# Patient Record
Sex: FEMALE | Race: WHITE | NOT HISPANIC OR LATINO | Employment: PART TIME | URBAN - METROPOLITAN AREA
[De-identification: names, ages, dates, MRNs, and addresses within clinical notes are randomized per-mention and may not be internally consistent; named-entity substitution may affect disease eponyms.]

---

## 2020-05-17 ENCOUNTER — VIRTUAL VISIT (OUTPATIENT)
Dept: URGENT CARE | Facility: CLINIC | Age: 20
End: 2020-05-17

## 2020-05-17 ENCOUNTER — NURSE TRIAGE (OUTPATIENT)
Dept: NURSING | Facility: CLINIC | Age: 20
End: 2020-05-17

## 2020-05-17 DIAGNOSIS — Z53.9 ERRONEOUS ENCOUNTER--DISREGARD: Primary | ICD-10-CM

## 2020-05-17 NOTE — TELEPHONE ENCOUNTER
Patient calling - says she was in contact with someone tested positive for COVID19.  She had a rash on her face which resolved and now has headaches off and on.  She is requesting testing for COVID19 as she has roommates who have family members who would be at risk if exposed.    No difficulty breathing.      Triaged to disposition of Home Care.  Care advice given per protocol.  Patient states she intends to call [facility] tomorrow when the clinic is open to ask about testing.    Cyndy Mccracken RN  Triage Nurse Advisor    Reason for Disposition    [1] COVID-19 infection diagnosed or suspected AND [2] mild symptoms (fever, cough) AND [3] no trouble breathing or other complications    COVID-19 Testing, questions about    Additional Information    Negative: SEVERE difficulty breathing (e.g., struggling for each breath, speaks in single words)    Negative: Difficult to awaken or acting confused (e.g., disoriented, slurred speech)    Negative: Bluish (or gray) lips or face now    Negative: Shock suspected (e.g., cold/pale/clammy skin, too weak to stand, low BP, rapid pulse)    Negative: Sounds like a life-threatening emergency to the triager    Negative: SEVERE or constant chest pain (Exception: mild central chest pain, present only when coughing)    Negative: MODERATE difficulty breathing (e.g., speaks in phrases, SOB even at rest, pulse 100-120)    Negative: Patient sounds very sick or weak to the triager    Negative: MILD difficulty breathing (e.g., minimal/no SOB at rest, SOB with walking, pulse <100)    Negative: Chest pain    Negative: Fever > 103 F (39.4 C)    Negative: [1] Fever > 101 F (38.3 C) AND [2] age > 60    Negative: [1] Fever > 100.0 F (37.8 C) AND [2] bedridden (e.g., nursing home patient, CVA, chronic illness, recovering from surgery)    Negative: HIGH RISK patient (e.g., age > 64 years, diabetes, heart or lung disease, weak immune system)    Negative: Fever present > 3 days (72 hours)     Negative: [1] Fever returns after gone for over 24 hours AND [2] symptoms worse or not improved    Negative: [1] Continuous (nonstop) coughing interferes with work or school AND [2] no improvement using cough treatment per protocol    Negative: Cough present > 3 weeks    Protocols used: CORONAVIRUS (COVID-19) DIAGNOSED OR [id number] 4.22.20

## 2020-05-17 NOTE — PROGRESS NOTES
This encounter was opened in error. Please disregard.  Patient was called and reported that she thought this visit had been cancelled.

## 2021-09-07 ENCOUNTER — TELEPHONE (OUTPATIENT)
Dept: BEHAVIORAL HEALTH | Facility: CLINIC | Age: 21
End: 2021-09-07

## 2021-09-07 NOTE — TELEPHONE ENCOUNTER
[facility] called with pt to scheduled  eval for day/iop programming    eval 09/14/21   Annetta guillen

## 2021-09-14 ENCOUNTER — HOSPITAL ENCOUNTER (OUTPATIENT)
Dept: BEHAVIORAL HEALTH | Facility: CLINIC | Age: 21
Discharge: HOME | End: 2021-09-14
Attending: FAMILY MEDICINE | Admitting: FAMILY MEDICINE
Payer: COMMERCIAL

## 2021-09-14 PROCEDURE — 90791 PSYCH DIAGNOSTIC EVALUATION: CPT | Mod: GT | Performed by: COUNSELOR

## 2021-09-14 RX ORDER — RIZATRIPTAN BENZOATE 5 MG/1
5 TABLET, ORALLY DISINTEGRATING ORAL
COMMUNITY

## 2021-09-14 RX ORDER — GABAPENTIN 250 MG/5ML
300 SOLUTION ORAL 3 TIMES DAILY
COMMUNITY
End: 2021-11-10

## 2021-09-14 RX ORDER — PROPRANOLOL HCL 60 MG
CAPSULE, EXTENDED RELEASE 24HR ORAL DAILY
COMMUNITY

## 2021-09-14 RX ORDER — DULOXETIN HYDROCHLORIDE 60 MG/1
60 CAPSULE, DELAYED RELEASE ORAL 2 TIMES DAILY
COMMUNITY

## 2021-09-14 RX ORDER — HYDROXYZINE PAMOATE 25 MG/1
25 CAPSULE ORAL AT BEDTIME
COMMUNITY

## 2021-09-14 RX ORDER — ONDANSETRON 8 MG/1
8 TABLET, FILM COATED ORAL EVERY 8 HOURS PRN
COMMUNITY

## 2021-09-14 ASSESSMENT — ANXIETY QUESTIONNAIRES
6. BECOMING EASILY ANNOYED OR IRRITABLE: SEVERAL DAYS
GAD7 TOTAL SCORE: 14
4. TROUBLE RELAXING: NEARLY EVERY DAY
3. WORRYING TOO MUCH ABOUT DIFFERENT THINGS: MORE THAN HALF THE DAYS
1. FEELING NERVOUS, ANXIOUS, OR ON EDGE: NEARLY EVERY DAY
5. BEING SO RESTLESS THAT IT IS HARD TO SIT STILL: NEARLY EVERY DAY
7. FEELING AFRAID AS IF SOMETHING AWFUL MIGHT HAPPEN: SEVERAL DAYS
IF YOU CHECKED OFF ANY PROBLEMS ON THIS QUESTIONNAIRE, HOW DIFFICULT HAVE THESE PROBLEMS MADE IT FOR YOU TO DO YOUR WORK, TAKE CARE OF THINGS AT HOME, OR GET ALONG WITH OTHER PEOPLE: VERY DIFFICULT
2. NOT BEING ABLE TO STOP OR CONTROL WORRYING: SEVERAL DAYS

## 2021-09-14 ASSESSMENT — PATIENT HEALTH QUESTIONNAIRE - PHQ9: SUM OF ALL RESPONSES TO PHQ QUESTIONS 1-9: 21

## 2021-09-14 NOTE — PROGRESS NOTES
[facility]  Provider Name:  Sarah Duarte LPCC, JEFFERYC         PATIENT'S NAME: Francesca Griffin  PREFERRED NAME: Francesca  PRONOUNS:    MRN: [id number]  : 2000  ADDRESS: [address] [facility] [address]  ACCT. NUMBER:  [id number]  DATE OF SERVICE: 21  START TIME: 1310  END TIME: 1415  PREFERRED PHONE: [phone]  May we leave a program related message: Yes  SERVICE MODALITY:  Video Visit /Telephone visit      Provider verified identity through the following two step process.  Patient provided:  Patient  and Patient address    Telemedicine Visit: The patient's condition can be safely assessed and treated via synchronous audio and visual telemedicine encounter.      Reason for Telemedicine Visit: Patient has requested telehealth visit    Originating Site (Patient Location): Patient's home     Distant Site (Provider Location): Provider Remote Setting- Home Office    Consent:  The patient/guardian has verbally consented to: the potential risks and benefits of telemedicine (video visit) versus in person care; bill my insurance or make self-payment for services provided; and responsibility for payment of non-covered services.     Patient would like the video invitation sent by:  My Chart    Mode of Communication:  Video Conference via Amwell / Use telephone due to technical difficulties    As the provider I attest to compliance with applicable laws and regulations related to telemedicine.    UNIVERSAL ADULT Mental Health DIAGNOSTIC ASSESSMENT    Identifying Information:  Patient is a 21 year old, .  The pronoun use throughout this assessment reflects the patient's chosen pronoun.  Patient was referred for an assessment by Behavioral Health Provider at the [facility].  Patient attended the session alone.     Chief Complaint:   The reason for seeking services at this time is: Increased depression and anxiety.   The problem(s) began in middle school. Patient  "has attempted to resolve these concerns in the past through Individual therapy, medication management.    Social/Family History:  Patient reported they grew up in  [place], [place].  They were raised by biological parents.  Parents were always together.  Patient reported that their childhood as such: Generally happy but had a very stressful school experience.  Patient states that she was in a [employer] elementary school and started having homework in . They had to try out to get into their high school as it was an Art based high school, theater was three days a week on top of their academic work. Patient sister also went to these schools, so there was a lot of pressure to be able to perform. Patient described their current relationships with family of origin as such: \"Good, but emotionally distant, I don't want to worry them with my problems.\"     The patient describes their cultural background as .  Cultural influences and impact on patient's life structure, values, norms, and healthcare: Grew up in a city, raised in Rastafarian community (unitarian universalist and [place] [facility] [place]  Contextual influences on patient's health include: Family Factors emotionally closed off, middle income, patient sister was not able to leave for college due to financial issues, patient was only able to as a grandparent passed and left the family money. Patient has a lot of academic stress and has fears that if they don't do well in college they will be wasting their parents money. These factors will be addressed in the Preliminary Treatment plan. Patient identified their preferred language to be English. Patient reported they do not need the assistance of an  or other support involved in therapy.     Patient reported had no significant delays in developmental tasks.   Patient's highest education level was some college, patient is in college for Earth Science, they are concerned about the " general state of the world.  Patient identified the following learning problems: attention and concentration.  Modifications will not be used to assist communication in therapy. Patient reports they are able to understand written materials.    Patient's current relationship status is single.  Patient identified their sexual orientation as queer or pansexual.  Patient reported having zero child(ronny). Patient identified parents; siblings; friends as part of their support system.  Patient identified the quality of these relationships as good.      Patient's currently lives with three roommates also in college and they report that housing is stable.    Patient is currently student. They are taking less classes this semester.  Patient reports their finances are obtained through parents; other. Patient does identify finances as a current stressor.      Patient reported that they have not been involved with the legal system. Patient does not report they are under probation/ parole/ jurisdiction.      Patient's Strengths and Limitations:  Patient identified the following strengths or resources that will help them succeed in treatment: commitment to health and well being, friends / good social support, insight, intelligence and motivation. Things that may interfere with the patient's success in treatment include: lack of self worth, and worrying about the general state of the world and the future of it.    Personal and Family Medical History:  Patient does report a family history of mental health concerns.  Patient reports family history includes Anxiety Disorder in her sister; Depression in her sister; Mental Illness in her father and mother..     Patient reported the following previous diagnoses which include(s):  ADHD;an anxiety disorder;depression.  Patient reported symptoms began in middle school, they first noticed anxiety symptoms before they noticed depressive symptoms. Patient reports symptoms do impact ability to  function. Patient has received mental health services in the past which include the following: therapy;psychiatry.   Psychiatric Hospitalizations: none  Patient denies a history of civil commitment.  Currently, patient is receiving other mental health services.  These include psychiatry with Celine Basurto at [facility].    Patient has not had a physical exam to rule out medical causes for current symptoms.  Date of last physical exam was greater than a year ago and client was encouraged to schedule an exam with PCP. The patient does not have a PCP.  Patient has chronic migraines, sometimes 20 days out of the month, reports that they have some low level headache most days.  Patient denies pregnancy.  There are significant appetite / nutritional concerns / weight changes. They reports that their executive functioning is lacking-not figuring out what to eat when hungry, related to ADHD. Patient does not report a history of head injury / trauma / cognitive impairment.     GAIN-SS Tool:    When was the last time that you had significant problems... 9/14/2021   with feeling very trapped, lonely, sad, blue, depressed or hopeless about the future? Past month   with sleep trouble, such as bad dreams, sleeping restlessly, or falling asleep during the day? Past Month   with feeling very anxious, nervous, tense, scared, panicked or like something bad was going to happen? Past month   with becoming very distressed & upset when something reminded you of the past? Past month   with thinking about ending your life or committing suicide? Past month     When was the last time that you did the following things 2 or more times? 9/14/2021   Lied or conned to get things you wanted or to avoid having to do something? 2 to 12 months ago   Had a hard time paying attention at school, work or home? Past month   Had a hard time listening to instructions at school, work or home? Past month   Were a bully or threatened other people? Never    Started physical fights with other people? Never       Patient reports current meds as:   Outpatient Medications Marked as Taking for the 9/14/21 encounter (Hospital Encounter) with Sarah Duarte LPCC   Medication Sig     DULoxetine (CYMBALTA) 60 MG capsule Take 60 mg by mouth 2 times daily 200mg twice a day  300mg once a day     gabapentin (NEURONTIN) 300 MG/6ML oral solution Take 300 mg by mouth At Bedtime     hydrOXYzine (VISTARIL) 25 MG capsule Take 25 mg by mouth At Bedtime     ondansetron (ZOFRAN) 8 MG tablet Take 8 mg by mouth every 8 hours as needed for nausea     propranolol ER (INDERAL LA) 60 MG 24 hr capsule Take by mouth daily     rizatriptan (MAXALT-MLT) 5 MG ODT Take 5 mg by mouth at onset of headache for migraine     Current Outpatient Medications   Medication     DULoxetine (CYMBALTA) 60 MG capsule     gabapentin (NEURONTIN) 300 MG/6ML oral solution     hydrOXYzine (VISTARIL) 25 MG capsule     ondansetron (ZOFRAN) 8 MG tablet     propranolol ER (INDERAL LA) 60 MG 24 hr capsule     rizatriptan (MAXALT-MLT) 5 MG ODT     No current facility-administered medications for this encounter.     Medication Adherence:  Patient reports they are taking their medications as prescribed      Patient Allergies:  No Known Allergies    Medical History:  History reviewed. No pertinent past medical history.      Current Mental Status Exam:   Appearance:  Appropriate    Eye Contact:  Good   Psychomotor:  Normal       Gait / station:  no problem  Attitude / Demeanor: Cooperative  Pleasant  Speech      Rate / Production: Normal/ Responsive      Volume:  Normal  volume      Language:  intact  Mood:   Normal  Affect:   Appropriate    Thought Content: Clear   Thought Process: Goal Directed       Associations: No loosening of associations  Insight:   Good   Judgment:  Intact   Orientation:  All  Attention/concentration: Good    Rating Scales:    PHQ9:    PHQ-9 SCORE 9/14/2021   PHQ-9 Total Score 21       GAD7:    LLOYD-7 SCORE  9/14/2021   Total Score 14     CGI:     First: Considering your total clinical experience with this particular patient population, how severe are the patient's symptoms at this time?: 5 (9/14/2021  1:19 PM)      Most recent: Compared to the patient's condition at the START of treatment, this patient's condition is: 4 (9/14/2021  1:19 PM)    Substance Use:  Patient reported the following family history pertaining to substance use as such:. Patient has not received substance use disorder and/or gambling treatment in the past.  Patient has not ever been to detox.  Patient is not currently receiving any chemical dependency treatment. Patient reports no history of support group attendance.      Substance Age of first use Pattern and duration of use (include amounts and frequency) Date of last use     Withdrawal potential Route of administration   Alcohol 16 Patient reports that they drink on the weekends and 2-3 drinks with dinner. On average patient drinks between 2-4 drinks five days a week. Two drinks gets the patient tipsy. 9/13/2021  3 glasses of wine  Oral   Cannabis 16 Tried it when they were 16 and when they turned 18 they started using 2-3 time a week, can go months without using. 9/11/2021  Smoke    Amphetamines  No history of use      Cocaine/crack   No history of use      Hallucinogens  No history of use      Inhalants  No history of use      Heroin  No history of use      Other Opiates  No history of use      Benzodiazepine  No history of use      Barbiturates  No history of use      Over the counter meds  No history of use      Caffeine Child On average patient has two cups of coffee every day, sometimes three 9/14/2021  Oral   Nicotine         other substances not listed above:  Identify:   No history of use        Patient reported the following problems as a result of their substance use: no problems, not applicable.  Patient is not concerned about substance use. Patient reports no one is concerned about  their substance use.       Patient reports the following compulsive behaviors, concerns and treatment history:   Gambling - no issues reported.   Picking - no issues reported.   Hair Pulling - no issues reported.   Pornography - no issues reported.   Sexual Behaviors - no issues reported.   Shoplifting - no issues reported.   Shopping / Spending - no issues reported.   Social Media - no issues reported.   Video Games - no issue    Dimension Scale Ratings:    Dimension 1 -  Acute Intoxication/Withdrawal: 0 - No Problem  Dimension 2 - Biomedical: 0 - No Problem  Dimension 3 - Emotional/Behavioral/Cognitive Conditions: 1 - Minor Problem  Dimension 4 - Readiness to Change:  1 - Minor Problem  Dimension 5 - Relapse/Continued Use/ Continued Problem Potential: 1 - Minor Problem  Dimension 6 - Recovery Environment:  1 - Minor Problem    Significant Losses / Trauma / Abuse / Neglect Issues:   Patient did not serve in the .  There are indications or report of significant loss, trauma, abuse or neglect issues related to: Close family friend  about 18 months ago, around that same time a good friend ghosted them and they are still unclear why. Patient reports their biggest trauma was their education and academic pressure they had.  Concerns for possible neglect are not present.      Safety Assessment:   Current Safety Concerns:  Mower Suicide Severity Rating Scale (Short Version)  Mower Suicide Severity Rating (Short Version) 2021   Over the past 2 weeks have you felt down, depressed, or hopeless? yes   Over the past 2 weeks have you had thoughts of killing yourself? yes   Have you ever attempted to kill yourself? no     Patient denies current homicidal ideation and behaviors.  Patient denies current self-injurious ideation and behaviors.    Patient denied risk behaviors associated with substance use.  Patient denies any high risk behaviors associated with mental health symptoms.  Patient reports the  "following current concerns for their personal safety: None.  Patient reports there are not firearms in the house.         History of Safety Concerns:  Patient denied a history of homicidal ideation.     Patient reports a history of SIB (cutting), they started in middle school and stopped with little to no outside help, resumed cutting in college. Remote history of punching walls, skin picking, burning. Last engaged in behaviors over a year ago.  Patient denied a history of assaultive behaviors.    Patient denied a history of sexual assault behaviors.     Patient denied a history of risk behaviors associated with substance use.  Patient denies any history of high risk behaviors associated with mental health symptoms.  Patient reports the following protective factors: dedication to family or friends;regular sleep;effectively controls impulses;adherence with prescribed medication;living with other people;daily obligations;structured day;effective problem solving skills;commitment to well being;positive social skills;sense of personal control or determination    Risk Plan:  See Recommendations for Safety and Risk Management Plan    Review of Symptoms per patient report:  in terms of mental health symptoms, the patient reports the following: Depressive episode: reports depressed mood, characterized as moderate, without presence of neurovegetative symptoms.  Patient reports feeling helpless or hopeless. Patient reports that they have had these symptoms most of their life, with an \"up tick\" in severity and frequency about 3 years ago when they started college.  Dayan: Denies symptoms.  Psychosis: denies.  Anxiety: endorses long history of anxiety including restlessness, slow growing panic attacks that render them immobile. Other symptoms include: nausea, and a little bit of shaking, or small tremors. Patient reports these symptoms for as long as they can remember.  PTSD: denies symptoms.  OCD: denies symptoms.  Eating " disorder: Denies symptoms   SI/HI: Endorses passive suicidal ideation, denies plan at present, but in the past,has created one. Last plan was well over a year ago, currently denies plan and intent patient is able to contract for safety. Patient does not have a history of suicide attempts. Other mental health symptoms include: Depression: Change in sleep, Lack of interest, Excessive or inappropriate guilt, Change in energy level, Difficulties concentrating, Change in appetite, Psychomotor slowing or agitation, Suicidal ideation, Feelings of hopelessness, Feelings of helplessness, Low self-worth, Ruminations, Irritability, Feeling sad, down, or depressed, Withdrawn and Self-injurious behavior  Psychosis: No Symptoms  Panic:  Palpitations and Tremors  Post Traumatic Stress Disorder:  Dissociation   ADD / ADHD:  Inattentive, Difficulties listening, Poor task completion, Poor organizational skills, Distractibility, Forgetful and Restlessness/fidgety  Conduct Disorder: No symptoms  Autism Spectrum Disorder: No symptoms  Obsessive Compulsive Disorder: No Symptoms    Diagnostic Criteria:   A) Recurrent episode(s) - symptoms have been present during the same 2-week period and represent a change from previous functioning 5 or more symptoms (required for diagnosis)   - Depressed mood. Note: In children and adolescents, can be irritable mood.     - Diminished interest or pleasure in all, or almost all, activities.    - Increased sleep.    - Psychomotor activity retardation.    - Fatigue or loss of energy.    - Feelings of worthlessness or inappropriate and excessive guilt.    - Diminished ability to think or concentrate, or indecisiveness.    - Recurrent thoughts of death (not just fear of dying), recurrent suicidal ideation without a specific plan, or a suicide attempt or a specific plan for committing suicide.   B) The symptoms cause clinically significant distress or impairment in social, occupational, or other important  areas of functioning  C) The episode is not attributable to the physiological effects of a substance or to another medical condition  D) The occurrence of major depressive episode is not better explained by other thought / psychotic disorders  E) There has never been a manic episode or hypomanic episode    Mixed anxiety-depressive disorder: clinically significant symptoms of anxiety and depression, but the criteria are not met for either a specific Mood Disorder or a specific Anxiety Disorder.  Clinically significant social phobic symptoms that are related to the social impact of having a general medical condition or mental disorder  The client does not report enough symptoms for the full criteria of any specific Anxiety Disorder to have been met  Anxiety disorder is present, but at this time therapist is unable to determine whether it is primary.  Further assessment needed.  Client reports the following symptoms of anxiety:   - Excessive anxiety and worry about a number of events or activities (such as work or school performance).    - The person finds it difficult to control the worry.   - Restlessness or feeling keyed up or on edge.    - Being easily fatigued.    - Difficulty concentrating or mind going blank.    - Irritability.    - Muscle tension.    - Sleep disturbance (difficulty falling or staying asleep, or restless unsatisfying sleep).    - The focus of the anxiety and worry is not confined to features of an Axis I disorder.   - The anxiety, worry, or physical symptoms cause clinically significant distress or impairment in social, occupational, or other important areas of functioning.    - The disturbance is not due to the direct physiological effects of a substance (e.g., a drug of abuse, a medication) or a general medical condition (e.g., hyperthyroidism) and does not occur exclusively during a Mood Disorder, a Psychotic Disorder, or a Pervasive Developmental Disorder.    - The aforementioned symptoms  began in middle school and occurs  7 days per week and is experienced as severe.      Functional Status:  Patient reports the following functional impairments: academic performance, health maintenance, management of the household and or completion of tasks, relationship(s), social interactions and work / vocational responsibilities.       WHODAS:   WHODAS 2.0 Total Score 9/14/2021   Total Score 31     Programmatic care:  Current LOCUS was assessed and patient needs the following level of care based on score 18  .    Clinical Summary:  1. Reason for assessment: Patient referred by current providers due to worsening mental health symptoms and suicidal ideation.  2. Psychosocial, Cultural and Contextual Factors: None identified.  3. Principal DSM5 Diagnoses  (Sustained by DSM5 Criteria Listed Above):   296.33 (F33.2) Major Depressive Disorder, Recurrent Episode, Severe _ and With mixed features.  4. Other Diagnoses that is relevant to services:   300.01 (F41.0) Panic Disorder  300.00 (F41.9) Unspecified Anxiety Disorder.  5. Provisional Diagnosis:  NA.  6. Prognosis: Return to Normal Functioning and Relieve Acute Symptoms.  7. Likely consequences of symptoms if not treated:  If untreated, patient's mental health will likely deteriorate and may require a higher level of care.  8. Client strengths include:  creative, educated, empathetic, goal-focused, has a previous history of therapy, insightful, intelligent, motivated, support of family, friends and providers and wants to learn .     Recommendations:     1. A safety and risk management plan has been developed including: Patient consented to co-developed safety plan.  Safety and risk management plan was completed. Patient agreed to use safety plan should any safety concerns arise.  A copy was given to the patient.. Report to child / adult protection services was not applicable.     2. Patient did not identify Baptist, ethnic or cultural issues relevant to therapy at  this time     3. Initial Treatment will focus on:               Depressed Mood -               Anxiety -               Functional Impairment at: home and school/work.              Risk Management / Safety Concerns related to: SI, SIB     4. Resources/Service Plan:    services are not indicated.   Modifications to assist communication are not indicated.   Additional disability accommodations are not indicated.      5. Collaboration:   Collaboration / coordination of treatment will be initiated with the following support professionals:      6.  Referrals:   The following referral(s) will be initiated:  Next Scheduled Appointment: TBD.     A Release of Information has been obtained for the following:      -Psychiatry: Celine Ahuja    7. NILDA:     NILDA:  Discussed the general effects of drugs and alcohol on health and well-being.    8. Records:   These were not available for review at time of assessment. Information in this assessment was obtained from the medical record and provided by patient who presents as a good historian. Patient will have open access to their mental health medical record.      Provider Name/ Credentials: GENI Avendano LADC,  Diagnostic Assessment completed on 2021                      LOCUS Worksheet     Name: Francesca Griffin MRN: [id number]    : 2000      Gender:  female    PMI:     Provider Name: Obed   Provider NPI:  [id number]     Actual level of Care Provided:  Therapy and Medication Management    Service(s) receiving or referred to:  IOP    Reason for Variance: Due to worsening mental health symptoms, decline in functioning, and passive SI      Rating completed by: GENI Avendano, EUNICE      I. Risk of Harm:   2      Low Risk of Harm    II. Functional Status:   3      Moderate Impairment    III. Co-Morbidity:   2      Minor Co-Morbidity    IV - A. Recovery Environment - Level of Stress:   3      Moderately Stress Environment    IV - B. Recovery  "Environment - Level of Support:   3      Limited Support in Environment    V. Treatment and Recovery History:   3      Moderate to Equivocal Response to Treatment and Recovery Management    VI. Engagement and Recovery Project:   2      Positive Engagement and Recovery       18 Composite Score    Level of Care Recommendation:   17 to 19       High Intensity Community Based Services                     Outpatient Mental Health Services - Adult    MY COPING PLAN FOR SAFETY    PATIENT'S NAME: Francesca Griffin  MRN:   [id number]    SAFETY PLAN:    Step 1: Warning signs / cues (Thoughts, images, mood, situation, behavior) that a crisis may be developing:      Thoughts: \"I don't matter\", \"People would be better off without me\", \"I'm a burden\", \"I can't do this anymore\", \"I just want this to end\", and \"Nothing makes it better.\"    Images: obsessive thoughts of death or dying and flashbacks.    Thinking Processes: ruminations (can't stop thinking about my problems): racing thoughts, intrusive thoughts (bothersome, unwanted thoughts that come out of nowhere), highly critical and negative thoughts, disorganized thinking, and paranoia.    Mood: worsening depression, hopelessness, helplessness, intense anger, intense worry, agitation, disinhibited (not caring about things or consequences), and mood swings    Behaviors: isolating/withdrawing, using drugs, using alcohol, can't stop crying, impulsive, reckless behaviors (acting without thinking), giving things away, saying good-bye, aggression, not taking care of myself, not taking care of my responsibilities, sleeping too much, and not sleeping enough.    Situations: loss, legal issues, changes in symptoms, relationship problems, relapse, and financial stress       Step 2: Coping strategies - Things I can do to take my mind off of my problems without contacting another person (relaxation technique, physical activity):      Distress Tolerance Strategies:  relaxation activities: A few " "self-soothing ideas to consider: taking a hot bath or shower, listening to music, or exercising, listen to positive upbeat music.    Physical Activities: go for a walk, exercise, yoga, meditation, deep breathing, and stretching.    Focus on helpful thoughts:  \"This is temporary\", \"I will get through this\", and remind myself of what is important to me.      Step 3: People and social settings that provide distraction:     Movie theater, zoo, coffee shop, park, library, community center, gym , [employer]oration[employer], school, and/or work.    Step 4: Remind myself of people and things that are important to me and worth living for:      Friends and family.    Step 5: When I am in crisis, I can ask these people to help me use my safety plan:      Your list of trusted contacts may include your significant other, friends, relatives, or your .    Step 6: Making the environment safe:       Take all medicines as directed.  Make no changes unless your doctor suggests them.     Go to all your doctor visits.    Be sure to have all your required lab tests. This way, your medicines can be refilled on time.    Do not use any drugs not prescribed by your doctor.    Avoid alcohol.    Remove all sharps/firearms.    Lock up medications.    Step 7: Professionals or agencies I can contact during a crisis: Members of your professional health care team can include your psychiatrist and your therapist as well as a crisis hotline.      Suicide Prevention Lifeline: 1-142-447-TALK (0337)    Crisis Text Line Service (available 24 hours a day, 7 days a week): Text [place] to 163869    Call  **CRISIS (802228) from a cell phone to talk to a team of professionals who can help you.    [facility] Resources:    Crisis Intervention: 980.762.5599 or 861-205-8871 (TTY: 872.111.3524).  Call anytime for help.  Alcoholics Anonymous (www.alcoholics-anonymous.org): Check your phone book for your local chapter.  Suicide Awareness Voices of Education (SAVE) " (www.save.org): 888-511-SAVE (7283)  National Suicide Prevention Line (www.mentalhealthmn.org): 699-465-QRMK (9948)  Mental Health Consumer/Survivor Network of [place] (www.mhcsn.net): 614.938.4413 or 973-119-9275  Mental Health Association [place] (www.mentalhealth.org): 123.153.5377 or 635-091-6489  [facility] Crisis (COPE) Response - Adult 598 893-0835  Suicide Prevention Lifeline: 7-029-283-TALK (3251)   Crisis Text Line: text HOME to 997796 in the [place]  KB [place]: (381) 797-7730        Call 911 or go to my nearest emergency department.     I helped develop this safety plan and agree to use it when needed.  I have been given a copy of this plan.        Today s date:  9/14/2021    Adapted from Safety Plan Template 2008 Karly Gardner and Ventura Moss is reprinted with the express permission of the authors.  No portion of the Safety Plan Template may be reproduced without the express, written permission.  You can contact the authors at [email or web address] or [email or web address]

## 2021-09-15 ENCOUNTER — BEH TREATMENT PLAN (OUTPATIENT)
Dept: BEHAVIORAL HEALTH | Facility: CLINIC | Age: 21
End: 2021-09-15
Attending: PSYCHIATRY & NEUROLOGY
Payer: COMMERCIAL

## 2021-09-15 DIAGNOSIS — F33.2 MAJOR DEPRESSIVE DISORDER, RECURRENT EPISODE, SEVERE WITH ANXIOUS DISTRESS (H): ICD-10-CM

## 2021-09-15 ASSESSMENT — ANXIETY QUESTIONNAIRES: GAD7 TOTAL SCORE: 14

## 2021-09-15 NOTE — TELEPHONE ENCOUNTER
----- Message from Sarah Duarte, LPCC sent at 9/15/2021 12:48 PM CDT -----  Regarding: ADT Start  Scheduling Request    Patient Name: Francesca Griffin  Location of programming: Virtual  Start Date: Monday 9/20/2021  Group: ADULT MH DAY 4B IOP.  [id number].  1PM - 4PM. MARK, T, TH   Attending Provider (MD): Moose  Number of visits to be scheduled: 36  Duration of Appointment in minutes: 180  Visit Type: Zoom - 2650

## 2021-09-16 ENCOUNTER — TELEPHONE (OUTPATIENT)
Dept: BEHAVIORAL HEALTH | Facility: CLINIC | Age: 21
End: 2021-09-16

## 2021-09-16 NOTE — PROGRESS NOTES
"Admission Date: 9/16/2021    Confirm patient pronouns: they/them    Why are you seeking treatment/What do you want to focus on during treatment? \"I want to feel better and have a baseline of being to function without my mental health getting in the way.\"    Identify any current concerns with potential impact to admission:     medication/medical concerns: None reported     immediate safety concerns: None reported    Does patient have safety plan? Yes, sent copy to patient via [employer]  Note: Please copy safety plan copied into BEH Encounter     Other (insurance/childcare/transportation/housing/planned absences/etc): client experiences chronic migraines and worries about the 80% attendance policy.      Patient[place].     Does patient need appointment with provider? Yes    Review patient's program schedule and inform them of any variation due to late days or holidays.                                                                                  Completed by: MANNY Farfan on 9/16/2021 at 8:40 AM    "

## 2021-09-16 NOTE — PROGRESS NOTES
"Outpatient Mental Health Services - Adult     MY COPING PLAN FOR SAFETY     PATIENT'S NAME:    Francesca Griffin  MRN:                           [id number]     SAFETY PLAN:     Step 1: Warning signs / cues (Thoughts, images, mood, situation, behavior) that a crisis may be developing:     ? Thoughts: \"I don't matter\", \"People would be better off without me\", \"I'm a burden\", \"I can't do this anymore\", \"I just want this to end\", and \"Nothing makes it better.\"  ? Images: obsessive thoughts of death or dying and flashbacks.  ? Thinking Processes: ruminations (can't stop thinking about my problems): racing thoughts, intrusive thoughts (bothersome, unwanted thoughts that come out of nowhere), highly critical and negative thoughts, disorganized thinking, and paranoia.  ? Mood: worsening depression, hopelessness, helplessness, intense anger, intense worry, agitation, disinhibited (not caring about things or consequences), and mood swings  ? Behaviors: isolating/withdrawing, using drugs, using alcohol, can't stop crying, impulsive, reckless behaviors (acting without thinking), giving things away, saying good-bye, aggression, not taking care of myself, not taking care of my responsibilities, sleeping too much, and not sleeping enough.  ? Situations: loss, legal issues, changes in symptoms, relationship problems, relapse, and financial stress   ?    Step 2: Coping strategies - Things I can do to take my mind off of my problems without contacting another person (relaxation technique, physical activity):     ? Distress Tolerance Strategies:  relaxation activities: A few self-soothing ideas to consider: taking a hot bath or shower, listening to music, or exercising, listen to positive upbeat music.  ? Physical Activities: go for a walk, exercise, yoga, meditation, deep breathing, and stretching.  ? Focus on helpful thoughts:  \"This is temporary\", \"I will get through this\", and remind myself of what is important to me.       Step 3: " People and social settings that provide distraction:      Movie theater, zoo, coffee shop, park, library, community center, gym , Yazidi, school, and/or work.     Step 4: Remind myself of people and things that are important to me and worth living for:       Friends and family.     Step 5: When I am in crisis, I can ask these people to help me use my safety plan:       Your list of trusted contacts may include your significant other, friends, relatives, or your .     Step 6: Making the environment safe:      ? Take all medicines as directed.  Make no changes unless your doctor suggests them.   ? Go to all your doctor visits.  ? Be sure to have all your required lab tests. This way, your medicines can be refilled on time.  ? Do not use any drugs not prescribed by your doctor.  ? Avoid alcohol.  ? Remove all sharps/firearms.  ? Lock up medications.     Step 7: Professionals or agencies I can contact during a crisis: Members of your professional health care team can include your psychiatrist and your therapist as well as a crisis hotline.     ? Suicide Prevention Lifeline: 4-865-657-TALK (3127)  ? Crisis Text Line Service (available 24 hours a day, 7 days a week): Text [place] to 532140  ? Call  **CRISIS (374313) from a cell phone to talk to a team of professionals who can help you.     [facility] Resources:     Crisis Intervention: 658.384.8277 or 684-508-6944 (TTY: 771.348.8970).  Call anytime for help.  Alcoholics Anonymous (www.alcoholics-anonymous.org): Check your phone book for your local chapter.  Suicide Awareness Voices of Education (SAVE) (www.save.org): 000-744-PMUT (7476)  National Suicide Prevention Line (www.mentalhealthmn.org): 136-032-BOYN (4335)  Mental Health Consumer/Survivor Network of [place] (www.mhcsn.net): 842.871.9537 or 369-381-1581  Mental Health Association of [place] (www.mentalhealth.org): 471.800.3402 or 380-056-7690  [facility] Crisis (COPE) Response - Adult 612  323-0749  Suicide Prevention Lifeline: 4-344-345-TALK (5329)   Crisis Text Line: text HOME to 932640 in the [place]  KB [place]: (357) 580-4741        ? Call 911 or go to my nearest emergency department.             I helped develop this safety plan and agree to use it when needed.  I have been given a copy of this plan.          Today s date:  9/14/2021     Adapted from Safety Plan Template 2008 Karly Gardner and Ventura Moss is reprinted with the express permission of the authors.  No portion of the Safety Plan Template may be reproduced without the express, written permission.  You can contact the authors at [email or web address] or [email or web address]

## 2021-09-16 NOTE — TELEPHONE ENCOUNTER
Writer called client to complete the admission paperwork.  Writer sent client the PHQ-9, LLOYD-7 and a copy of their safety plan via [employer].  See admission BEH encounter for more information.

## 2021-09-16 NOTE — TELEPHONE ENCOUNTER
Writer called client to go over admission paperwork.  Client requested writer to call back at 3:30pm to complete the paperwork.

## 2021-09-20 ENCOUNTER — TELEPHONE (OUTPATIENT)
Dept: BEHAVIORAL HEALTH | Facility: CLINIC | Age: 21
End: 2021-09-20

## 2021-09-20 ENCOUNTER — HOSPITAL ENCOUNTER (OUTPATIENT)
Dept: BEHAVIORAL HEALTH | Facility: CLINIC | Age: 21
End: 2021-09-20
Attending: PSYCHIATRY & NEUROLOGY
Payer: COMMERCIAL

## 2021-09-20 PROBLEM — F33.2 MAJOR DEPRESSIVE DISORDER, RECURRENT EPISODE, SEVERE WITH ANXIOUS DISTRESS (H): Status: ACTIVE | Noted: 2021-09-20

## 2021-09-20 PROCEDURE — 90853 GROUP PSYCHOTHERAPY: CPT | Mod: GT

## 2021-09-20 PROCEDURE — 90853 GROUP PSYCHOTHERAPY: CPT | Mod: 95

## 2021-09-20 NOTE — PROGRESS NOTES
Adult Day Treatment Program:  Individualized Treatment Plan       Date of Plan: 21    Name: Francesca Griffin MRN: [id number]    : 2000     Program: Adult Day Treatment Program (ADT)    Clinical Track: 4B    DSM5 Diagnosis:  296.33 (F33.2) Major Depressive Disorder, Recurrent Episode, Severe _ and With mixed features.  300.01 (F41.0) Panic Disorder  300.00 (F41.9) Unspecified Anxiety Disorder.    55+ Multidisciplinary Team Members:  Dr Frederic Virk MD and/or Dr. Catherine Clarke PsyD, , and/or Dr. Iban Nelson MD,    Francesca Griffin will participate in the Adult Day Treatment Program 3 days per week, 3 hours per day.   Anticipated duration/discharge: 12 weeks    Due to COVID-19, services will be delivered via telemedicine until further notice.     Program Start Date: 21  Anticipated Discharge Date: 2021 (pending authorization/clinical changes)    NOTE: Complete CGI     Review Date: Does Francesca Griffin continue to meet criteria to participate in the ADT Program, 3 days per week; 3 hours per day?   2021 Yes MANNY Farfan on 2021 at 10:43 AM     2021 yes   21 discharged             Client Strengths:  caring, creative, has a previous history of therapy, insightful, motivated, wants to learn and willing to relate to others    Client Participation in Plan:  Contributed to goals and plan   Attended individual treatment plan meeting on 2021  Agrees with plan   Received copy of treatment plan   Discussed with staff     Areas of Vulnerability:  Anxiety  Depressive symptoms   Sensory deficit: ADHD, Sensory Processing Challenges  Physical/medical: Chronic Migraines    Long-Term Goals:  Knowledge about illness and management of symptoms   Maintenance of personal safety   Breaking the pattern of mental burnout     Abuse Prevention Plan:  Safe, therapeutic environment   Education regarding illness and skill development   Coordination with care providers     Discharge  "Criteria:  Satisfactory progress toward treatment goals   Improvement re: identified problems and symptoms   Ability to continue recovery at next level of service   Has a discharge plan in place      Areas of Treatment Focus     Why are you seeking treatment/What do you want to focus on during treatment? \"Client responded with the hope that group offers a space designated to talking about mental health since they are unable to talk about mental health with their family and friends.  Client also hopes to work on self-validation and avoiding  gaslighting  themselves.   \"       Area of Treatment Focus:   Symptom Stabilization and Management  Start Date:    9/27/21    Goal:  Target Date: 11/18/2021 Status: Completed  Francesca would like to use group to get the support and feedback they do not get in social or family relationships.   They would like to improve their stress management skills.  They would like to develop a spot between being not anxious and panicked.      Progress:   11/18/21:  Francesca is consistently using group to ask for feedback from peers.    They are practicing stress management skill and skills for managing anxiety/panic.  12/9/21:  Client effectively used group to learn and practice skills.        Treatment Strategies:   Teach adaptive coping skills and communication skills        Area of Treatment Focus:   Personal Safety  Start Date:    9/27/21    Goal:  Target Date: 11/18/2021 Status: Completed  Client will notify staff when needing assistance to develop or implement a coping plan to manage suicidal or self injurious urges.  Francesca denies current suicidal ideation.   They deny current self-injurious ideation.      Progress:   11/18/21:  Francesca has used skills to manage hopelessness, passive suicidal thoughts and self-injurious ideation.    12/9/21:  Francesca consistently used skills to manage SI/SIB thoughts.  They denied SI at discharge.    Treatment Strategies:   Teach adaptive coping skills and " "communication skills      Area of Treatment Focus:   Community Resources / Support and Discharge Planning  Start Date:    9/27/21    Goal:  Target Date: 11/18/2021 Status: Completed  Will develop an aftercare / transition plan by discharge.   Francesca gets psychiatry with Celine Basurto at [facility].  Francesca does not have an individual therapist.  Francesca is [facility] up with the [facility].        Progress:   11/18/21:  Francesca worked to complete two incomplete classes.  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  They are withdrawing from the current semester and are using the [facility] for assistance.  12/9/21:   Francesca is searching for a gender specialist therapist.  They have other providers in place.   They will spend the holidays at home then decide the next steps.        Treatment Strategies:   Use reality based supportive approach     Perla Garvin, [facility] HospitalMANNY Newberry on 9/27/2021 at 6:28 PM        NOTE: Required signatures are completed manually and scanned into the electronic medical record. See \"Media\" tab in epic.    The Individualized Treatment Plan Signature Page has been routed to the provider for co-sign.        "

## 2021-09-20 NOTE — TELEPHONE ENCOUNTER
RN Review of Medical History / Physical Health Screen  Outpatient Mental Health Programs - [facility] Mental Health Day Treatment    PATIENT'S NAME: Francesca Griffin  MRN:   [id number]  :   2000  ACCT. NUMBER: [id number]  CURRENT AGE:  21 year old    DATE OF DIAGNOSTIC ASSESSMENT: 21  DATE OF ADMISSION: 21     Please see Diagnostic Assessment for additional Medical History.     General Health:   Have you had any exposure to any communicable disease in the past 2-3 weeks? yes has cold     Are you aware of safe sex practices? yes   Do you have a history of seizures?     If so, do you have a seizure plan? Known triggers?     Notify patient that we will call 911 (if virtual) or a code (if in-person), if we were to witness seizure during group. no            yes     Nutrition:    Are you on a special diet? If yes, please explain:  yes pescatarian    Do you have any concerns regarding your nutritional status? If yes, please explain:  no   Have you had any appetite changes in the last 3 months?  Yes, decreased linda due to symptoms     Have you had any weight loss or weight gain in the last 3 months?  No     Do you have a history of an eating disorder? no   Do you have a history of being in an eating disorder program? no     Patient height and weight recorded by RN in epic flowsheet: No - Unable to measure  Because of temporary in-person programmatic suspension due to COVID-19 pandemic, all pt weights and heights will be collected through patient self-report an recorded in physical health screening progress note upon admission to the program.                            Height/Weight Review:  Patient reported height: 5'3       Patient reports weight:  Date last checked: 140lbs      Any referrals/needs identified? none             Fall Risk:   Have you had any falls in the past 3 months? no     Do you currently use any assistive devices for mobility?   no      Additional  Comments/Assessment:      No concerns    Per completion of the Medical History / Physical Health Screen, is there a recommendation to see / follow up with a primary care physician/clinic or dentist?    Yes, Recommendations: establish with PCP. Has a provider in mind and will reach out if they need assistance with obtaining a PCP.      Gabriele Ball RN  9/20/2021

## 2021-09-20 NOTE — TELEPHONE ENCOUNTER
Writer called Pt today to check in since did not start as planned.  Writer left a vm message with Pt requesting a return phone call to the main program line.

## 2021-09-20 NOTE — GROUP NOTE
Psychotherapy Group Note    PATIENT'S NAME: Francesca Griffin  MRN:   [id number]  :   2000  ACCT. NUMBER: [id number]  DATE OF SERVICE: 21  START TIME:  3:00 PM  END TIME:  3:50 PM  FACILITATOR: Hattie Virk  TOPIC:  EBP Group: [facility] Adult Mental Health Day Treatment  TRACK: 4B                                      Service Modality:  Video Visit     Telemedicine Visit: The patient's condition can be safely assessed and treated via synchronous audio and visual telemedicine encounter.      Reason for Telemedicine Visit:  covid 19     Originating Site (Patient Location): Patient's home    Distant Site (Provider Location): Provider Remote Setting- Home Office    Consent:  The patient/guardian has verbally consented to: the potential risks and benefits of telemedicine (video visit) versus in person care; bill my insurance or make self-payment for services provided; and responsibility for payment of non-covered services.     Patient would like the video invitation sent by:  My Chart    Mode of Communication:  Video Conference via Medical Zoom    As the provider I attest to compliance with applicable laws and regulations related to telemedicine.          NUMBER OF PARTICIPANTS: 7    Summary of Group / Topics Discussed:  Mindfulness: What is Mindfulness: Patients received an overview on what mindfulness is and how mindfulness can benefit general health, mental health symptoms, and stressors. The history of mindfulness, its application to mental health therapies, and key concepts were also discussed. Patients discussed current awareness, knowledge, and practice of mindfulness skills. Patients also discussed barriers to mindfulness practice.     Patient Session Goals / Objectives:    Demonstrated understanding of key concepts and application to daily life    Identified when/how to use mindfulness     Resolved barriers to practice    Identified plan to use mindfulness in daily  life      Patient Participation / Response:  Fully participated with the group by sharing personal reflections / insights and openly received / provided feedback with other participants.    Demonstrated understanding of topics discussed through group discussion and participation, Demonstrated understanding of mindfulness skills and benefits of practice and Identified / Expressed personal readiness to practice mindfulness skills    Treatment Plan:  Patient has an initial individualized treatment plan that was created as part of their diagnostic assessment / admission process.  A master individualized treatment plan is in the process of being developed with the patient and multi-disciplinary care team.    Hattie Virk

## 2021-09-20 NOTE — GROUP NOTE
Process Group Note    PATIENT'S NAME: Francesca Griffin  MRN:   [id number]  :   2000  ACCT. NUMBER: [id number]  DATE OF SERVICE: 21  START TIME:  1:00 PM  END TIME:  1:50 PM  FACILITATOR: Perla Garvin LICSW; Adrianna Montoya LGSW  TOPIC:  Process Group    Diagnoses:  Mixed anxiety-depressive disorder: clinically significant symptoms of anxiety and depression, but the criteria are not met for either a specific Mood Disorder or a specific Anxiety Disorder.  Clinically significant social phobic symptoms that are related to the social impact of having a general medical condition or mental disorder  The client does not report enough symptoms for the full criteria of any specific Anxiety Disorder to have been met  Anxiety disorder is present, but at this time therapist is unable to determine whether it is primary.  Further assessment needed.      [facility] Mental Health Day Treatment  TRACK: 4B    NUMBER OF PARTICIPANTS: 7                                      Service Modality:  Video Visit     Telemedicine Visit: The patient's condition can be safely assessed and treated via synchronous audio and visual telemedicine encounter.      Reason for Telemedicine Visit: Services only offered telehealth    Originating Site (Patient Location): Patient's home    Distant Site (Provider Location): Provider Remote Setting- Home Office    Consent:  The patient/guardian has verbally consented to: the potential risks and benefits of telemedicine (video visit) versus in person care; bill my insurance or make self-payment for services provided; and responsibility for payment of non-covered services.     Patient would like the video invitation sent by:  My Chart    Mode of Communication:  Video Conference via Medical Zoom    As the provider I attest to compliance with applicable laws and regulations related to telemedicine.                Data:    Session content: At the start of this group, patients were invited to  check in by identifying themselves, describing their current emotional status, and identifying issues to address in this group.   Area(s) of treatment focus addressed in this session included Symptom Management, Personal Safety and Community Resources/Discharge Planning.  Client reported being sick today and was  terrified that they had given everyone COVID .  Client reported getting a negative covid test result this morning.  Client further introduced themselves stating that they grew up in [place] and moved to [place] for school  on purpose .  Client reported living in [place] for four years and that they don t have plans on graduating  anytime soon .  Client explained that they are prioritizing their mental health and slowing down by taking two classes this semester.  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  Client reported  struggling a lot since they came to college  with symptoms worsening in the last 1.5 months.  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  Client added that a recent ADHD diagnosis and executive dysfunction also has caused client to miss completing class work.  Client reports the desire to have a  Baseline that isn t a crash and burn pattern .  Client stated that they speak Chinese and have three roommates.  Writer asked client about their hopes for group therapy.  Client responded with the hope that group offers a space designated to talking about mental health since they are unable to talk about mental health with their family and friends.  Client also hopes to work on self-validation and avoiding  gaslighting  themselves.      Therapeutic Interventions/Treatment Strategies:  Psychotherapist offered support, feedback and validation and provided redirection. Treatment modalities used include Cognitive Behavioral Therapy. Interventions  include Symptoms Management: Promoted understanding of their diagnoses and how it impacts their functioning.    Assessment:    Patient response:   Patient responded to session by accepting feedback, giving feedback and listening    Possible barriers to participation / learning include: and no barriers identified    Health Issues:   None reported       Substance Use Review:   Substance Use: No active concerns identified.    Mental Status/Behavioral Observations  Appearance:   Appropriate   Eye Contact:   Good   Psychomotor Behavior: Normal   Attitude:   Cooperative  Interested Friendly  Orientation:   All  Speech   Rate / Production: Normal    Volume:  Normal   Mood:    Normal  Affect:    Appropriate   Thought Content:   Clear  Thought Form:  Coherent  Logical     Insight:    Good     Plan:     Safety Plan: No current safety concerns identified.  Recommended that patient call 911 or go to the local ED should there be a change in any of these risk factors.     Barriers to treatment: None identified    Patient Contracts (see media tab):  None    Substance Use: Not addressed in session     Continue or Discharge: Patient will continue in Adult Day Treatment (ADT)  as planned. Patient is likely to benefit from learning and using skills as they work toward the goals identified in their treatment plan.      MANNY Farfan  September 20, 2021

## 2021-09-20 NOTE — GROUP NOTE
Psychotherapy Group Note    PATIENT'S NAME: Francesca Griffin  MRN:   [id number]  :   2000  ACCT. NUMBER: [id number]  DATE OF SERVICE: 21  START TIME:  2:00 PM  END TIME:  2:50 PM  FACILITATOR: Adrianna Montoya LGSW; Perla Garvin LICSW  TOPIC: MH EBP Group: Specialty Awareness  [facility] Mental Health Day Treatment  TRACK: 4B    NUMBER OF PARTICIPANTS: 5    Summary of Group / Topics Discussed:  Specialty Topics: Life Transitions: The topic of life transitions was presented in order to help patients to better understand the challenges presented by life transitions, and how to best navigate them. Exploring the phases of transition and how one works through them was discussed. Patients were provided with information regarding community resources.     Patient Session Goals / Objectives:  Discussed the timing and nature of major life transitions  Explored how life transitions may impact mental health and functioning  Discussed coping strategies to manage symptoms and help with transitioning  Discussed and planned a successful transition        Patient Participation / Response:  Fully participated with the group by sharing personal reflections / insights and openly received / provided feedback with other participants.    Demonstrated understanding of topics discussed through group discussion and participation, Identified / Expressed readiness to act on skill suggestions discussed in topic and Verbalized understanding of ways to proactively manage illness    Treatment Plan:  Patient has an initial individualized treatment plan that was created as part of their diagnostic assessment / admission process.  A master individualized treatment plan is in the process of being developed with the patient and multi-disciplinary care team.    MANNY Farfan

## 2021-09-21 ENCOUNTER — HOSPITAL ENCOUNTER (OUTPATIENT)
Dept: BEHAVIORAL HEALTH | Facility: CLINIC | Age: 21
End: 2021-09-21
Attending: PSYCHIATRY & NEUROLOGY
Payer: COMMERCIAL

## 2021-09-21 PROCEDURE — 90853 GROUP PSYCHOTHERAPY: CPT | Mod: 95

## 2021-09-21 NOTE — GROUP NOTE
Psychotherapy Group Note    PATIENT'S NAME: Francesca Griffin  MRN:   [id number]  :   2000  ACCT. NUMBER: [id number]  DATE OF SERVICE: 21  START TIME:  3:00 PM  END TIME:  3:50 PM  FACILITATOR: Hattie Virk  TOPIC: MH EBP Group: Symptom Awareness  [facility] Mental Health Day Treatment  TRACK: 4B                                      Service Modality:  Video Visit     Telemedicine Visit: The patient's condition can be safely assessed and treated via synchronous audio and visual telemedicine encounter.      Reason for Telemedicine Visit:  covid 19     Originating Site (Patient Location): Patient's home    Distant Site (Provider Location): Provider Remote Setting- Home Office    Consent:  The patient/guardian has verbally consented to: the potential risks and benefits of telemedicine (video visit) versus in person care; bill my insurance or make self-payment for services provided; and responsibility for payment of non-covered services.     Patient would like the video invitation sent by:  My Chart    Mode of Communication:  Video Conference via Medical Zoom    As the provider I attest to compliance with applicable laws and regulations related to telemedicine.          NUMBER OF PARTICIPANTS: 7    Summary of Group / Topics Discussed:  Symptom Awareness: Mood Disorders: Patients received a general overview of mood disorders including depressive disorders, anxiety disorders, and bipolar disorders and how it relates to their current symptoms. The purpose is to promote understanding of their diagnoses and how it impacts their functioning. Patients reviewed their current awareness of symptoms and diagnoses. Patients received information regarding diagnoses, etiology, cultural, and environmental factors as well as impact on functioning.     Patient Session Goals / Objectives:    Discussed patient individual symptoms and experiences    Reviewed diagnostic criteria and etiology of diagnoses          Patient Participation / Response:  Fully participated with the group by sharing personal reflections / insights and openly received / provided feedback with other participants.    Demonstrated understanding of topics discussed through group discussion and participation, Demonstrated understanding of how information regarding symptoms can assist in management of symptoms and Identified / Expressed personal readiness to increase awareness of symptoms and apply skills as necessary    Treatment Plan:  Patient has an initial individualized treatment plan that was created as part of their diagnostic assessment / admission process.  A master individualized treatment plan is in the process of being developed with the patient and multi-disciplinary care team.    Hattie Virk

## 2021-09-21 NOTE — DISCHARGE SUMMARY
"       Adult Mental Health Intensive Outpatient Discharge Summary/Instructions      Patient: Francesca Griffin MRN: [id number]   : 2000 Age: 21 year old Sex: adult Francesca uses they/them pronouns    Admission Date: 21  Discharge Date: 21  Diagnosis:   296.33 (F33.2) Major Depressive Disorder, Recurrent Episode, Severe _ and With mixed features.  300.01 (F41.0) Panic Disorder  300.00 (F41.9) Unspecified Anxiety Disorder.      Focus of Treatment / Progress    Personal Safety: Francesca consistently uses their safety plan.  Francesca denied suicidal ideation at discharge.     * Follow your safety plan     * Call crisis lines as needed:    [facility] [phone] [facility] [phone]  [facility] [phone] Crisis Connection [phone]  [facility] [phone] [facility] [phone]  [facility] [phone] National Suicide Prevention [phone]  [facility] [phone] Suicide Prevention [phone]  [facility] [phone]    Managing symptoms of:  Francesca work on skils to manage anxiety and panic.  They worked on skills to manage sleep disturbance, depressed mood, and low motivation.  They worked on decisions about need for medical leave from college.      Community support/health:  Francesca worked on maintaining a stable sleep cycle.  They also worked on skills to manage low appetite.    Managing Symptoms and Preventing Relapse    * Go to all of your appointments    * Take all medications as directed.      * Carry a current list if medication with you    * Do not use illicit (street) drugs.  Avoid alcohol    * Report these symptoms to your care team. These are early signs of relapse:   Thoughts of suicide   Losing more sleep   Increased confusion   Mood getting worse   Feeling more aggressive   Other:  Increased isolation, increased panic attacks    *Use these skills daily:  Talk to someone you trust at least one time weekly, set boundaries and say \"no\", be assertive, " act opposite of negative feelings, accept challenges with a positive attitude, exercise at least three times per week for 30 minutes,  get enough sleep, eat healthy foods, get into a good routine    Copy of summary sent to: EPIC    Follow up with psychiatrist / main caregiver: psychiatry with Celine Basurto at [place] or provider in [place] if live at home next semester       Next visit: as scheduled    Follow up with your therapist: Francesca is looking for a gender specialty therapist.  A referral list has been provided.    Next visit: as scheduled by Francesca    Go to group therapy and / or support groups at: Consider [facility] support groups at www.namimn.org  Groups are available nationwide.  Young Adult Groups and GLBTQ+ groups are available.    See your medical doctor about:  General care needs.    Other:  Francesca is taking a medical leave for this semester and next semester.   They may return to live in [place].   Francesca currently has a shared apartment and employment in [place].  Francesca may stay in [place] and live with their family during the spring.      Your treatment team appreciates having the opportunity to work with you and wishes you the best.    Client Signature:Unable to sign due to COVID 19 pandemic Date / Time:___________  Staff Signature:FRANKIE Villarreal, [facility]  Date / Time:___________

## 2021-09-21 NOTE — GROUP NOTE
Psychotherapy Group Note    PATIENT'S NAME: Francesca Griffin  MRN:   [id number]  :   2000  ACCT. NUMBER: [id number]  DATE OF SERVICE: 21  START TIME:  2:00 PM  END TIME:  2:50 PM  FACILITATOR: Hattie Virk  TOPIC: MH EBP Group: Symptom Awareness  [facility] Mental Health Day Treatment  TRACK: 4B                                      Service Modality:  Video Visit     Telemedicine Visit: The patient's condition can be safely assessed and treated via synchronous audio and visual telemedicine encounter.      Reason for Telemedicine Visit:  covid 19    Originating Site (Patient Location): Patient's home    Distant Site (Provider Location): Provider Remote Setting- Home Office    Consent:  The patient/guardian has verbally consented to: the potential risks and benefits of telemedicine (video visit) versus in person care; bill my insurance or make self-payment for services provided; and responsibility for payment of non-covered services.     Patient would like the video invitation sent by:  My Chart    Mode of Communication:  Video Conference via Medical Zoom    As the provider I attest to compliance with applicable laws and regulations related to telemedicine.          NUMBER OF PARTICIPANTS: 7    Summary of Group / Topics Discussed:  Symptom Awareness: Mood Disorders: Patients received a general overview of mood disorders including depressive disorders, anxiety disorders, and bipolar disorders and how it relates to their current symptoms. The purpose is to promote understanding of their diagnoses and how it impacts their functioning. Patients reviewed their current awareness of symptoms and diagnoses. Patients received information regarding diagnoses, etiology, cultural, and environmental factors as well as impact on functioning.     Patient Session Goals / Objectives:    Discussed patient individual symptoms and experiences    Reviewed diagnostic criteria and etiology of diagnoses          Patient Participation / Response:  Fully participated with the group by sharing personal reflections / insights and openly received / provided feedback with other participants.    Demonstrated understanding of topics discussed through group discussion and participation, Demonstrated understanding of how information regarding symptoms can assist in management of symptoms and Identified / Expressed personal readiness to increase awareness of symptoms and apply skills as necessary    Treatment Plan:  Patient has an initial individualized treatment plan that was created as part of their diagnostic assessment / admission process.  A master individualized treatment plan is in the process of being developed with the patient and multi-disciplinary care team.    Hattie Virk

## 2021-09-22 ENCOUNTER — HOSPITAL ENCOUNTER (OUTPATIENT)
Dept: BEHAVIORAL HEALTH | Facility: CLINIC | Age: 21
End: 2021-09-22
Attending: PSYCHIATRY & NEUROLOGY
Payer: COMMERCIAL

## 2021-09-22 PROCEDURE — 99214 OFFICE O/P EST MOD 30 MIN: CPT | Mod: 95 | Performed by: PSYCHIATRY & NEUROLOGY

## 2021-09-22 PROCEDURE — 99207 PR CDG-MDM COMPONENT: MEETS MODERATE - DOWN CODED: CPT | Performed by: PSYCHIATRY & NEUROLOGY

## 2021-09-22 NOTE — PROGRESS NOTES
"Psychiatry evaluation, on starting Adult Day Treatment  Patient seen via telemedicine.  Care discussed with treatment team staff.    HPI: Started adult day treatment, no longer wants to die.    There were no vitals taken for this visit.    Patient Active Problem List   Diagnosis     Major depressive disorder, recurrent episode, severe with anxious distress (H)     Other psychiatry diagnoses are ADHD, Anxiety, Panic  Medical diagnoses are Migraines  10 point ROS negative except for Migraines and irregular menses, LMP uncertain  Current Outpatient Medications   Medication     DULoxetine (CYMBALTA) 60 MG capsule     gabapentin (NEURONTIN) 300 MG/6ML oral solution     hydrOXYzine (VISTARIL) 25 MG capsule     ondansetron (ZOFRAN) 8 MG tablet     propranolol ER (INDERAL LA) 60 MG 24 hr capsule     rizatriptan (MAXALT-MLT) 5 MG ODT     No current facility-administered medications for this encounter.     Duloxetine is 60mg twice/day, provider Celine Basurto added Bupropion, Francesca has not started it yet.  The propranolol is for Migraine prevention.     No Known Allergies     Family history positive for sister with anxiety and depression, both parents are anxious.    ADHD diagnosed about one year ago, symptoms include trouble with sustaiuned concentration, poor retention of information \"executive dysfunction\"     Depression onset age 12, symptoms of sad mood, apathy, tiredness, appetite loss, sleep onset and maintenance problems    Anxiety onset childhood, symptoms of ruminating, second guessing self, chest discomfort    No past psychiatry jospital stays or committments    Patient grew up in [place]. Currently single.    General appearance: good  Alert.   Affect: fair  Mood: fair    Speech:  normal.   Eye contact:  good.    Psychomotor behavior: normal  Gait: normal.    Abnormal movements: none  Delusions: none  Hallucinations:   none  Thoughts: logical  Associations: intact  Judgement: good   Insight:good  Cognitions: " intact in conversation  Memory:  intact in conversation  Orientation: normal    Not suicidal.    Past medications: Lexapro, Abilify, Adderall    IMP: MDD severe recurrent without psychotic features  2.  Anxiety (LLOYD) and Panic  3.  ADHD    Plan: Begin day treatment  2.  She was advised that starting Bupropion would increase Propranolol blood levels and to check for dizziness or lower pulse.      Video-Visit Details    Type of service:  Video Visit    Video Start Time (time video started): 1530    Video End Time (time video stopped): 1600    Originating Location (pt. Location): Home    Distant Location (provider location): Provider remote location    Mode of Communication:  Video Conference via [facility]    Physician has received verbal consent for a Video Visit from the patient? Yes      Frederic Virk MD

## 2021-09-22 NOTE — GROUP NOTE
Process Group Note    PATIENT'S NAME: Francesca Griffin  MRN:   [id number]  :   2000  ACCT. NUMBER: [id number]  DATE OF SERVICE: 21  START TIME:  1:00 PM  END TIME:  1:50 PM  FACILITATOR: Perla Garvin LICSW; Adrianna Montoya LGSW  TOPIC:  Process Group    Diagnoses:  296.33 (F33.2) Major Depressive Disorder, Recurrent, Severe and with Mixed Features  300.01 (F41.0) Panic Disoder  300.00 (F41.9) Unspecified Anxiety Disorder      [facility] Mental Health Day Treatment  TRACK: 4B    NUMBER OF PARTICIPANTS: 7                                      Service Modality:  Video Visit     Telemedicine Visit: The patient's condition can be safely assessed and treated via synchronous audio and visual telemedicine encounter.      Reason for Telemedicine Visit: Services only offered telehealth    Originating Site (Patient Location): Patient's home    Distant Site (Provider Location): Provider Remote Setting- Home Office    Consent:  The patient/guardian has verbally consented to: the potential risks and benefits of telemedicine (video visit) versus in person care; bill my insurance or make self-payment for services provided; and responsibility for payment of non-covered services.     Patient would like the video invitation sent by:  My Chart    Mode of Communication:  Video Conference via Medical Zoom    As the provider I attest to compliance with applicable laws and regulations related to telemedicine.            Data:    Session content: At the start of this group, patients were invited to check in by identifying themselves, describing their current emotional status, and identifying issues to address in this group.   Area(s) of treatment focus addressed in this session included Symptom Management, Personal Safety and Community Resources/Discharge Planning.  Client reported feeling sick today, having a  foggy brain  and that they were tired after getting up early to attend a meeting with their professor.   Client explained that this meeting was related to an incomplete they had in a summer course.  Client reported experiencing an  anxiety cycle  with meeting academic deadlines, explaining feelings of disconnectedness and anxiety as things  pile up .  Group validated client s feelings and experience as a student living with ADHD.  Client reported feeling anxious with  relearning  how to complete tasks and the desire for their professor to understand their incomplete was not due to laziness or procrastination.  Group members offered tips and tricks for meeting deadlines including body doubling, alternatives to task initiating and use interests to gain momentum into completing necessary tasks.      Therapeutic Interventions/Treatment Strategies:  Psychotherapist offered support, feedback and validation and reinforced use of skills. Treatment modalities used include Cognitive Behavioral Therapy. Interventions include Coping Skills: discussed ADHD specific coping strategies and Symptoms Management: Promoted understanding of their diagnoses and how it impacts their functioning.    Assessment:    Patient response:   Patient responded to session by accepting feedback, giving feedback and listening    Possible barriers to participation / learning include: and no barriers identified    Health Issues:   None reported       Substance Use Review:   Substance Use: No active concerns identified.    Mental Status/Behavioral Observations  Appearance:   Appropriate   Eye Contact:   Good   Psychomotor Behavior: Normal   Attitude:   Cooperative  Interested Friendly  Orientation:   All  Speech   Rate / Production: Normal    Volume:  Normal   Mood:    Normal  Affect:    Appropriate   Thought Content:   Clear  Thought Form:  Coherent  Logical     Insight:    Good     Plan:   Safety Plan: No current safety concerns identified.  Recommended that patient call 911 or go to the local ED should there be a change in any of these risk factors.    Barriers to treatment: None identified  Patient Contracts (see media tab):  None  Substance Use: Not addressed in session   Continue or Discharge: Patient will continue in Adult Day Treatment (ADT)  as planned. Patient is likely to benefit from learning and using skills as they work toward the goals identified in their treatment plan.      Adrianna Montoya, MANNY  September 21, 2021

## 2021-09-23 ENCOUNTER — HOSPITAL ENCOUNTER (OUTPATIENT)
Dept: BEHAVIORAL HEALTH | Facility: CLINIC | Age: 21
End: 2021-09-23
Attending: PSYCHIATRY & NEUROLOGY
Payer: COMMERCIAL

## 2021-09-23 PROCEDURE — 90853 GROUP PSYCHOTHERAPY: CPT | Mod: GT

## 2021-09-23 NOTE — GROUP NOTE
Process Group Note    PATIENT'S NAME: Francesca Griffin  MRN:   [id number]  :   2000  ACCT. NUMBER: [id number]  DATE OF SERVICE: 21  START TIME:  2:00 PM  END TIME:  2:50 PM  FACILITATOR: Adrianna Montoya LGSW; [facility] Garvin [facility]  TOPIC:  Process Group    Diagnoses:   Principal DSM5 Diagnoses  (Sustained by DSM5 Criteria Listed Above):   296.33 (F33.2) Major Depressive Disorder, Recurrent Episode, Severe _ and With mixed features.  4. Other Diagnoses that is relevant to services:   300.01 (F41.0) Panic Disorder  300.00 (F41.9) Unspecified Anxiety Disorder.        [facility] Day Treatment  TRACK: 4B    NUMBER OF PARTICIPANTS: 8                                      Service Modality:  Video Visit     Telemedicine Visit: The patient's condition can be safely assessed and treated via synchronous audio and visual telemedicine encounter.      Reason for Telemedicine Visit: Services only offered telehealth    Originating Site (Patient Location): Patient's home    Distant Site (Provider Location): Provider Remote Setting- Home Office    Consent:  The patient/guardian has verbally consented to: the potential risks and benefits of telemedicine (video visit) versus in person care; bill my insurance or make self-payment for services provided; and responsibility for payment of non-covered services.     Patient would like the video invitation sent by:  My Chart    Mode of Communication:  Video Conference via Medical Zoom    As the provider I attest to compliance with applicable laws and regulations related to telemedicine.           Data:    Session content: At the start of this group, patients were invited to check in by identifying themselves, describing their current emotional status, and identifying issues to address in this group.   Area(s) of treatment focus addressed in this session included Symptom Management, Personal Safety and Community Resources/Discharge Planning.  Client reported  "experiencing physical anxiety and panic that was building, explaining that they felt \"shaky, tense and looking around a lot\".  Client reported being unsure the source of the anxiety and suggested it might be because of stressful dreams last night or the stress of school.    Client reported feeing proud of accomplishing the tasks of folding and putting away laundry and washing towels yesterday, despite feeling anxious.  Client also stated that they went to class today despite almost cancelling.  Client stated the reason for attending class as \"If I don't go to class, then I won't do the work\".  Client further explained that they knew \"if they thought about it, it wouldn't have happened\".  Writer commended client on their self-awareness and showing up to class and accomplishing other tasks despite feelings of anxiety.  Client did not report any suicidal ideation, plan or intent.      Therapeutic Interventions/Treatment Strategies:  Psychotherapist offered support, feedback and validation and reinforced use of skills. Treatment modalities used include Cognitive Behavioral Therapy. Interventions include Behavioral Activation: Reinforced benefits/challenges of change process through applying skills to replace unwanted behaviors and Coping Skills: Facilitated understanding of  what factors may contribute to symptom relapse and skills plan to manage symptom relapse .    Assessment:    Patient response:   Patient responded to session by accepting feedback, giving feedback and listening    Possible barriers to participation / learning include: and no barriers identified    Health Issues:   None reported       Substance Use Review:   Substance Use: No active concerns identified.    Mental Status/Behavioral Observations  Appearance:   Appropriate   Eye Contact:   Good   Psychomotor Behavior: Normal   Attitude:   Cooperative  Friendly Pleasant  Orientation:   All  Speech   Rate / Production: Normal    Volume:  Normal "   Mood:    Anxious   Affect:    Appropriate   Thought Content:   Clear  Thought Form:  Coherent  Logical     Insight:    Good     Plan:     Safety Plan: No current safety concerns identified.  Recommended that patient call 911 or go to the local ED should there be a change in any of these risk factors.     Barriers to treatment: None identified    Patient Contracts (see media tab):  None    Substance Use: Not addressed in session     Continue or Discharge: Patient will continue in Adult Day Treatment (ADT)  as planned. Patient is likely to benefit from learning and using skills as they work toward the goals identified in their treatment plan.      MANNY Farfan  September 23, 2021

## 2021-09-23 NOTE — ADDENDUM NOTE
Encounter addended by: Adrianna Montoya LGSW on: 9/23/2021 5:13 PM   Actions taken: Flowsheet accepted, Clinical Note Signed

## 2021-09-23 NOTE — ADDENDUM NOTE
Encounter addended by: Adrianna Montoya LGSW on: 9/23/2021 4:45 PM   Actions taken: Clinical Note Signed

## 2021-09-23 NOTE — GROUP NOTE
Psychotherapy Group Note    PATIENT'S NAME: Francesca Griffin  MRN:   [id number]  :   2000  ACCT. NUMBER: [id number]  DATE OF SERVICE: 21  START TIME:  1:00 PM  END TIME:  1:50 PM  FACILITATOR: Hattie Virk  TOPIC: MH EBP Group: Specialty Awareness  [facility] Mental Health Day Treatment  TRACK: 4B                                      Service Modality:  Video Visit     Telemedicine Visit: The patient's condition can be safely assessed and treated via synchronous audio and visual telemedicine encounter.      Reason for Telemedicine Visit:  covid 19    Originating Site (Patient Location): Patient's home    Distant Site (Provider Location): Provider Remote Setting- Home Office    Consent:  The patient/guardian has verbally consented to: the potential risks and benefits of telemedicine (video visit) versus in person care; bill my insurance or make self-payment for services provided; and responsibility for payment of non-covered services.     Patient would like the video invitation sent by:  My Chart    Mode of Communication:  Video Conference via Medical Zoom    As the provider I attest to compliance with applicable laws and regulations related to telemedicine.          NUMBER OF PARTICIPANTS: 8    Summary of Group / Topics Discussed:  Specialty Topics: Hope: The topic of hope was presented in order to help patients better understand the symptoms of hopelessness and how to become more hopeful. Patients discussed their current awareness of the topic and relevance to their functioning. Individual experiences with symptoms and treatment options were also discussed. Patients explored options for ongoing/future treatment and symptom management.      Patient Session Goals / Objectives:    Discussed definition of hopelessness    Discussed how hopelessness impacts functioning    Set a plan to utilize skills to reduce hopelessness        Patient Participation / Response:  Fully participated with the  group by sharing personal reflections / insights and openly received / provided feedback with other participants.    Demonstrated understanding of topics discussed through group discussion and participation, Identified / Expressed readiness to act on skill suggestions discussed in topic and Verbalized understanding of ways to proactively manage illness    Treatment Plan:  Patient has a current master individualized treatment plan.  See Epic treatment plan for more information.    Hattie Virk

## 2021-09-23 NOTE — GROUP NOTE
Psychoeducation Group Note    PATIENT'S NAME: Francesca Griffin  MRN:   [id number]  :   2000  ACCT. NUMBER: [id number]  DATE OF SERVICE: 21  START TIME:  3:00 PM  END TIME:  3:50 PM  FACILITATOR: Hattie Virk  TOPIC: MANUEL RN Group: Health Maintenance  [facility] Day Treatment  TRACK: 4B                                      Service Modality:  Video Visit     Telemedicine Visit: The patient's condition can be safely assessed and treated via synchronous audio and visual telemedicine encounter.      Reason for Telemedicine Visit:  covid 19     Originating Site (Patient Location): Patient's home    Distant Site (Provider Location): Provider Remote Setting- Home Office    Consent:  The patient/guardian has verbally consented to: the potential risks and benefits of telemedicine (video visit) versus in person care; bill my insurance or make self-payment for services provided; and responsibility for payment of non-covered services.     Patient would like the video invitation sent by:  My Chart    Mode of Communication:  Video Conference via Medical Zoom    As the provider I attest to compliance with applicable laws and regulations related to telemedicine.          NUMBER OF PARTICIPANTS: 8    Summary of Group / Topics Discussed:  Health Maintenance: Weekend planning: Patients were given time to complete a weekend plan of what they will do to promote wellness and sobriety over the weekend when they do not have the structure of group. Patients were encouraged to review progress on their treatment goals and were challenged to identify ways to work toward meeting them. Patients identified and discussed foreseeable barriers to success over the weekend and then developed a plan to overcome them. Patients reviewed their distress coping skills and social support network and discussed this with the group.       Patient Session Goals / Objectives:    ?    Identified activities to engage in that  promote balance in wellness  ?    Distinguished possible barriers to success over the weekend and created a plan to overcome them  ?    Listed distress coping skills and identified social support network to utilize if in crisis during the weekend          Patient Participation / Response:  Fully participated with the group by sharing personal reflections / insights and openly received / provided feedback with other participants.    Demonstrated understanding of topics discussed through group discussion and participation, Identified / Expressed personal readiness to practice skills and Verbalized understanding of health maintenance topic    Treatment Plan:  Patient has a current master individualized treatment plan.  See Epic treatment plan for more information.    Hattie Virk

## 2021-09-27 ENCOUNTER — HOSPITAL ENCOUNTER (OUTPATIENT)
Dept: BEHAVIORAL HEALTH | Facility: CLINIC | Age: 21
End: 2021-09-27
Attending: PSYCHIATRY & NEUROLOGY
Payer: COMMERCIAL

## 2021-09-27 PROCEDURE — 90853 GROUP PSYCHOTHERAPY: CPT | Mod: GT

## 2021-09-27 PROCEDURE — 90853 GROUP PSYCHOTHERAPY: CPT | Mod: 95

## 2021-09-27 NOTE — GROUP NOTE
Process Group Note    PATIENT'S NAME: Francesca Griffin  MRN:   [id number]  :   2000  ACCT. NUMBER: [id number]  DATE OF SERVICE: 21  START TIME:  1:00 PM  END TIME:  1:50 PM  FACILITATOR: Adrianna Montoya LGSW; Perla Garvin [facility]  TOPIC:  Process Group    Diagnoses:  Principal DSM5 Diagnoses  (Sustained by DSM5 Criteria Listed Above):   296.33 (F33.2) Major Depressive Disorder, Recurrent Episode, Severe _ and With mixed features.  4. Other Diagnoses that is relevant to services:   300.01 (F41.0) Panic Disorder  300.00 (F41.9) Unspecified Anxiety Disorder.      [facility] Day Treatment  TRACK: 4B    NUMBER OF PARTICIPANTS: 7          Data:  Symptom Management, Personal Safety and Community Resources/Discharge Planning  Session content: At the start of this group, patients were invited to check in by identifying themselves, describing their current emotional status, and identifying issues to address in this group.   Area(s) of treatment focus addressed in this session included .  Client reported having a  turbulent weekend  in relation to their schoolwork and job.    Client reported that after some back and forth, client went in to  a shift on Thursday night at their job.  Client reported the benefit of this job being a 5-10 minute walk from their house.  Client also reported deciding to wait until this week to start their normal shifts back up. Client reported feeling anxious over ongoing technological issues had delayed their ability to take a test online.  Client reported troubleshooting with their professor and attempting to take the test again on Friday, Saturday and .  Client reported feelings of anxiety, fatigue and headaches from the  mental exhaustion  this experience has caused.  Client reported their professor rescheduling the test for Friday.  Client expressed frustration with the  mental block  they had over the weekend, causing them to focus only on this  test and neglecting to complete other school tasks.  Client reported having the intention to send their completed disability resource center letter to their professors.  Writer asked client how they were practicing self-care, to which client was unsure of.  Writer encouraged client to think of ways to break the pattern of mental exhaustion using self-soothe strategies.  Client did not report any suicidal ideation, plan or intent.      Therapeutic Interventions/Treatment Strategies:  Psychotherapist offered support, feedback and validation and reinforced use of skills. Treatment modalities used include Cognitive Behavioral Therapy. Interventions include Coping Skills: Discussed use of self-soothe skills to decrease distress in the body and Symptoms Management: Promoted understanding of their diagnoses and how it impacts their functioning.    Assessment:    Patient response:   Patient responded to session by accepting feedback, giving feedback and listening    Possible barriers to participation / learning include: and no barriers identified    Health Issues:   Yes: Migraine, Associated Psychological Distress       Substance Use Review:   Substance Use: No active concerns identified.    Mental Status/Behavioral Observations  Appearance:   Appropriate   Eye Contact:   Good   Psychomotor Behavior: Normal   Attitude:   Cooperative  Interested Pleasant  Orientation:   All  Speech   Rate / Production: Normal    Volume:  Normal   Mood:    Anxious   Affect:    Appropriate   Thought Content:   Clear  Thought Form:  Coherent  Logical     Insight:    Good     Plan:     Safety Plan: No current safety concerns identified.  Recommended that patient call 911 or go to the local ED should there be a change in any of these risk factors.     Barriers to treatment: None identified    Patient Contracts (see media tab):  None    Substance Use: Not addressed in session     Continue or Discharge: Patient will continue in Adult Day Treatment  (ADT)  as planned. Patient is likely to benefit from learning and using skills as they work toward the goals identified in their treatment plan.      MANNY Farfan  September 27, 2021

## 2021-09-27 NOTE — PROGRESS NOTES
Patient Active Problem List   Diagnosis     Major depressive disorder, recurrent episode, severe with anxious distress (H)       Current Outpatient Medications:      DULoxetine (CYMBALTA) 60 MG capsule, Take 60 mg by mouth 2 times daily 200mg twice a day 300mg once a day, Disp: , Rfl:      gabapentin (NEURONTIN) 300 MG/6ML oral solution, Take 300 mg by mouth At Bedtime, Disp: , Rfl:      hydrOXYzine (VISTARIL) 25 MG capsule, Take 25 mg by mouth At Bedtime, Disp: , Rfl:      ondansetron (ZOFRAN) 8 MG tablet, Take 8 mg by mouth every 8 hours as needed for nausea, Disp: , Rfl:      propranolol ER (INDERAL LA) 60 MG 24 hr capsule, Take by mouth daily, Disp: , Rfl:      rizatriptan (MAXALT-MLT) 5 MG ODT, Take 5 mg by mouth at onset of headache for migraine, Disp: , Rfl:   Psychiatry staffing: case discussed  Diagnosis:  As above; plus anxiety and panic.  Here from Partial

## 2021-09-27 NOTE — GROUP NOTE
Psychotherapy Group Note    PATIENT'S NAME: Francesca Griffin  MRN:   [id number]  :   2000  ACCT. NUMBER: [id number]  DATE OF SERVICE: 21  START TIME:  3:00 PM  END TIME:  3:50 PM  FACILITATOR: Hattie Virk  TOPIC: MH EBP Group: Coping Skills  [facility] Adult Mental Health Day Treatment  TRACK: 4B    NUMBER OF PARTICIPANTS: 7                                      Service Modality:  Video Visit     Telemedicine Visit: The patient's condition can be safely assessed and treated via synchronous audio and visual telemedicine encounter.      Reason for Telemedicine Visit:  covid 19     Originating Site (Patient Location): Patient's home    Distant Site (Provider Location): Provider Remote Setting- Home Office    Consent:  The patient/guardian has verbally consented to: the potential risks and benefits of telemedicine (video visit) versus in person care; bill my insurance or make self-payment for services provided; and responsibility for payment of non-covered services.     Patient would like the video invitation sent by:  My Chart    Mode of Communication:  Video Conference via Medical Zoom    As the provider I attest to compliance with applicable laws and regulations related to telemedicine.          Summary of Group / Topics Discussed:  Coping Skills: Meditation: Patients learned about meditation and explored how and when to utilize it to increase focus, reduce mental health symptoms, decrease physical tension, and improve mental well-being.  Approaches to meditation were presented as a means of increasing self-awareness. The benefits of various meditation practices were discussed, as well as barriers to utilization of this coping strategy.     Patient Session Goals / Objectives:    Understand the purpose and efficacy of using meditation modalities to reduce stress / symptoms.    Review / discuss situations in daily life that cause distress, where establishing a meditation routine or meditating as  needed may improve functioning.      Verbalize understanding of how and when to apply grounding strategies to reduce distress and increase presence in the moment.    Practice meditation and address barriers to use in daily life.        Patient Participation / Response:  Fully participated with the group by sharing personal reflections / insights and openly received / provided feedback with other participants.    Demonstrated understanding of topics discussed through group discussion and participation, Expressed understanding of the relevance / importance of coping skills at distressing times in life and Demonstrated knowledge of when to consider using a variety of coping skills in daily life    Treatment Plan:  Patient has a current master individualized treatment plan.  See Epic treatment plan for more information.    Hattie Virk

## 2021-09-27 NOTE — GROUP NOTE
Psychotherapy Group Note    PATIENT'S NAME: Francesca Grfifin  MRN:   [id number]  :   2000  ACCT. NUMBER: [id number]  DATE OF SERVICE: 21  START TIME:  2:00 PM  END TIME:  2:50 PM  FACILITATOR: Perla Garvin LICSW; Adrianna Montoya LGSW  TOPIC:  EBP Group: Coping Skills  [facility] Mental Health Day Treatment  TRACK: 4B    NUMBER OF PARTICIPANTS: 7    Summary of Group / Topics Discussed:  Coping Skills: Self-Soothe: Patients learned to apply self-soothe as a way to decrease heightened stress in the moment.  Patients identified situations that necessitate self-soothe strategies.  They focused on ways to manage physical symptoms of distress using the senses. They discussed how to distinguish when this can be useful in their lives when other strategies are more relevant or helpful.    Patient Session Goals / Objectives:    Understand the purpose of using the senses to decrease distress    Process what happens in the body when using self-soothe strategies    Demonstrate understanding of when to use self-soothe strategies    Identify and problem solve barriers to applying self-soothe strategies.    Choose 1-2 self-soothe strategies to apply during times of distress.        Patient Participation / Response:  Fully participated with the group by sharing personal reflections / insights and openly received / provided feedback with other participants.    Demonstrated understanding of topics discussed through group discussion and participation, Expressed understanding of the relevance / importance of coping skills at distressing times in life and Identified barriers to applying coping skills    Treatment Plan:  Patient has a current master individualized treatment plan.  See Epic treatment plan for more information.    MANNY Farfan

## 2021-09-28 ENCOUNTER — HOSPITAL ENCOUNTER (OUTPATIENT)
Dept: BEHAVIORAL HEALTH | Facility: CLINIC | Age: 21
End: 2021-09-28
Attending: PSYCHIATRY & NEUROLOGY
Payer: COMMERCIAL

## 2021-09-28 PROCEDURE — 90853 GROUP PSYCHOTHERAPY: CPT | Mod: 95

## 2021-09-28 PROCEDURE — 90853 GROUP PSYCHOTHERAPY: CPT | Mod: GT

## 2021-09-28 NOTE — GROUP NOTE
Psychotherapy Group Note    PATIENT'S NAME: Francesca Griffin  MRN:   [id number]  :   2000  ACCT. NUMBER: [id number]  DATE OF SERVICE: 21  START TIME:  2:00 PM  END TIME:  2:50 PM  FACILITATOR: Hattie Virk  TOPIC: MH EBP Group: Coping Skills  [facility] Adult Mental Health Day Treatment  TRACK: 4B                                      Service Modality:  Video Visit     Telemedicine Visit: The patient's condition can be safely assessed and treated via synchronous audio and visual telemedicine encounter.      Reason for Telemedicine Visit:  covid 19     Originating Site (Patient Location): Patient's home    Distant Site (Provider Location): Provider Remote Setting- Home Office    Consent:  The patient/guardian has verbally consented to: the potential risks and benefits of telemedicine (video visit) versus in person care; bill my insurance or make self-payment for services provided; and responsibility for payment of non-covered services.     Patient would like the video invitation sent by:  My Chart    Mode of Communication:  Video Conference via Medical Zoom    As the provider I attest to compliance with applicable laws and regulations related to telemedicine.          NUMBER OF PARTICIPANTS: 8    Summary of Group / Topics Discussed:  Coping Skills: Distraction: Patients learned to mindfully use distraction as a way to decrease heightened stress in the moment.  Patients will identified situations that necessitate healthy distraction strategies.  They explored ways to manage physical symptoms of distress using distraction. The group began to distinguish when this can be useful in their lives or when other strategies would be more relevant or helpful.    Patient Session Goals / Objectives:    Understand the purpose and benefits of using healthy distraction to decrease distress.    Process what happens in the body when using distraction strategies.    Demonstrate understanding of when to use  distraction strategies.    Explore patient s current distraction activities, and how to take a more intentional approach to the use of distraction.    Identify and problem solve barriers to applying distraction strategies.    Choose 1-2 healthy distraction strategies to apply during times of distress.        Patient Participation / Response:  Minimally participated, only when prompted / asked.    Demonstrated understanding of topics discussed through group discussion and participation    Treatment Plan:  Patient has a current master individualized treatment plan.  See Epic treatment plan for more information.    Hattie Virk

## 2021-09-28 NOTE — ADDENDUM NOTE
Encounter addended by: Adrianna Montoya LGSW on: 9/28/2021 9:03 AM   Actions taken: Clinical Note Signed

## 2021-09-28 NOTE — GROUP NOTE
Psychotherapy Group Note    PATIENT'S NAME: Francesca Griffin  MRN:   [id number]  :   2000  ACCT. NUMBER: [id number]  DATE OF SERVICE: 21  START TIME:  3:00 PM  END TIME:  3:50 PM  FACILITATOR: Hattie Virk  TOPIC: MH EBP Group: Coping Skills  [facility] Day Treatment  TRACK: 4B                                      Service Modality:  Video Visit     Telemedicine Visit: The patient's condition can be safely assessed and treated via synchronous audio and visual telemedicine encounter.      Reason for Telemedicine Visit:  covid 19     Originating Site (Patient Location): Patient's home    Distant Site (Provider Location): Provider Remote Setting- Home Office    Consent:  The patient/guardian has verbally consented to: the potential risks and benefits of telemedicine (video visit) versus in person care; bill my insurance or make self-payment for services provided; and responsibility for payment of non-covered services.     Patient would like the video invitation sent by:  My Chart    Mode of Communication:  Video Conference via Medical Zoom    As the provider I attest to compliance with applicable laws and regulations related to telemedicine.          NUMBER OF PARTICIPANTS: 8    Summary of Group / Topics Discussed:  Coping Skills: Distraction: Patients learned to mindfully use distraction as a way to decrease heightened stress in the moment.  Patients will identified situations that necessitate healthy distraction strategies.  They explored ways to manage physical symptoms of distress using distraction. The group began to distinguish when this can be useful in their lives or when other strategies would be more relevant or helpful.    Patient Session Goals / Objectives:    Understand the purpose and benefits of using healthy distraction to decrease distress.    Process what happens in the body when using distraction strategies.    Demonstrate understanding of when to use  distraction strategies.    Explore patient s current distraction activities, and how to take a more intentional approach to the use of distraction.    Identify and problem solve barriers to applying distraction strategies.    Choose 1-2 healthy distraction strategies to apply during times of distress.        Patient Participation / Response:  Minimally participated, only when prompted / asked.    Demonstrated understanding of topics discussed through group discussion and participation    Treatment Plan:  Patient has a current master individualized treatment plan.  See Epic treatment plan for more information.    Hattie Virk

## 2021-09-28 NOTE — PROGRESS NOTES
Acknowledgement of Current Treatment Plan       I have reviewed my treatment plan with my therapist / counselor on 9/27/21.   I agree with the plan as it is written in the electronic health record. (4B)    Name:      Signature:  Francesca Griffin Unable to sign due to COVID-19. Verbal permission obtained on 9/27/21.     Dr Frederic Virk MD  Psychiatrist    Adrianna Montoya MINOO Psychotherapist MANNY Farfan on 9/28/2021 at 8:45 AM     [facility] Garvin [facility]  Psychotherapist FRANKIE Villarreal, [facility]

## 2021-09-28 NOTE — ADDENDUM NOTE
Encounter addended by: Adrianna Montoya LGSW on: 9/28/2021 9:06 AM   Actions taken: Flowsheet accepted

## 2021-09-28 NOTE — GROUP NOTE
Process Group Note    PATIENT'S NAME: Francesca Griffin  MRN:   [id number]  :   2000  ACCT. NUMBER: [id number]  DATE OF SERVICE: 21  START TIME:  1:00 PM  END TIME:  1:50 PM  FACILITATOR: Perla Garvin, [facility]; Adrianna Montoya LGSW  TOPIC:  Process Group    Diagnoses:   Principal DSM5 Diagnoses  (Sustained by DSM5 Criteria Listed Above):   296.33 (F33.2) Major Depressive Disorder, Recurrent Episode, Severe _ and With mixed features.  4. Other Diagnoses that is relevant to services:   300.01 (F41.0) Panic Disorder  300.00 (F41.9) Unspecified Anxiety Disorder.      [facility] Day Treatment  TRACK: 4B    NUMBER OF PARTICIPANTS: 8          Data:    Session content: At the start of this group, patients were invited to check in by identifying themselves, describing their current emotional status, and identifying issues to address in this group.   Area(s) of treatment focus addressed in this session included Symptom Management, Personal Safety and Community Resources/Discharge Planning.  Client reported struggling with sleep ( I went to bed at 5:30am yesterday morning ;  I went to bed early last night out of peer exhaustion ).  Client hoped that waking up early this morning would reset their sleep schedule.  Client reported feeling unsure how they were feeling ( I don t felt great but I don t feel bad .  Client reported having a  shadow of a migraine .  Client reported that they  feel functional  but are  waiting for the other shoe to drop .  Writer reflected to client the adjustments they have been making to break the pattern of burnout.  Client listed their limited class schedule, ability to make their schedule structured but varied and a flexible job as positive contributors to client s wellbeing.  Co-facilitator encouraged client to notice negative thoughts, acknowledge the changes they have made and to allow themselves to adjust their plans as needed along the way.  Client did not  report any suicidal ideation, plan or intent.      Therapeutic Interventions/Treatment Strategies:  Psychotherapist offered support, feedback and validation and reinforced use of skills. Treatment modalities used include Cognitive Behavioral Therapy. Interventions include Cognitive Restructuring:  Explored impact of ineffective thoughts / distortions on mood and activity and Symptoms Management: Promoted understanding of their diagnoses and how it impacts their functioning.    Assessment:    Patient response:   Patient responded to session by accepting feedback, giving feedback and listening    Possible barriers to participation / learning include: and no barriers identified    Health Issues:   Yes: Cold and Migraine Symptoms, Associated Psychological Distress       Substance Use Review:   Substance Use: No active concerns identified.    Mental Status/Behavioral Observations  Appearance:   Appropriate   Eye Contact:   Good   Psychomotor Behavior: Normal   Attitude:   Cooperative  Friendly Pleasant  Orientation:   All  Speech   Rate / Production: Normal    Volume:  Normal   Mood:    Anxious  Depressed   Affect:    Appropriate   Thought Content:   Clear  Thought Form:  Coherent  Logical     Insight:    Good     Plan:     Safety Plan: No current safety concerns identified.  Recommended that patient call 911 or go to the local ED should there be a change in any of these risk factors.     Barriers to treatment: None identified    Patient Contracts (see media tab):  None    Substance Use: Not addressed in session     Continue or Discharge: Patient will continue in Adult Day Treatment (ADT)  as planned. Patient is likely to benefit from learning and using skills as they work toward the goals identified in their treatment plan.      MANNY Fafran  September 28, 2021

## 2021-09-30 ENCOUNTER — HOSPITAL ENCOUNTER (OUTPATIENT)
Dept: BEHAVIORAL HEALTH | Facility: CLINIC | Age: 21
End: 2021-09-30
Attending: PSYCHIATRY & NEUROLOGY
Payer: COMMERCIAL

## 2021-09-30 PROCEDURE — 90853 GROUP PSYCHOTHERAPY: CPT | Mod: 95

## 2021-09-30 PROCEDURE — 90853 GROUP PSYCHOTHERAPY: CPT | Mod: GT | Performed by: COUNSELOR

## 2021-09-30 NOTE — GROUP NOTE
Psychotherapy Group Note    PATIENT'S NAME: Francesca Griffin  MRN:   [id number]  :   2000  ACCT. NUMBER: [id number]  DATE OF SERVICE: 21  START TIME:  3:00 PM  END TIME:  3:50 PM  FACILITATOR: Madyson Cristina LPCC  TOPIC: MH EBP Group: Coping Skills  [facility] Mental Health Day Treatment  TRACK: 4B    NUMBER OF PARTICIPANTS: 8    Summary of Group / Topics Discussed:  Coping Skills: Improve the Moment: Patients learned to tolerate distress, by applying strategies to effect positive change in the present moment.  Patients will identified situations where they would benefit from applying strategies to improve the moment and reduce distress. Patients discussed how to distinguish when this can be useful in their lives or when other strategies would be more relevant or helpful.    Patient Session Goals / Objectives:    Discuss how the use of intentional  in the moment  actions can help reduce distress.    Review patients current practices and discuss a more formal way of practicing and accessing skills.    Increase ability to decide when to use improve the moment strategies    Choose 1-2 in the moment actions to apply during times of distress.                                      Service Modality:  Video Visit     Telemedicine Visit: The patient's condition can be safely assessed and treated via synchronous audio and visual telemedicine encounter.      Reason for Telemedicine Visit: Services only offered telehealth    Originating Site (Patient Location): Patient's place of employment    Distant Site (Provider Location): Provider Remote Setting- Home Office    Consent:  The patient/guardian has verbally consented to: the potential risks and benefits of telemedicine (video visit) versus in person care; bill my insurance or make self-payment for services provided; and responsibility for payment of non-covered services.     Patient would like the video invitation sent by:  My Chart    Mode of  Communication:  Video Conference via Medical Zoom    As the provider I attest to compliance with applicable laws and regulations related to telemedicine.            Patient Participation / Response:  Moderately participated, sharing some personal reflections / insights and adequately adequately received / provided feedback with other participants.    Demonstrated understanding of topics discussed through group discussion and participation, Expressed understanding of the relevance / importance of coping skills at distressing times in life and Demonstrated knowledge of when to consider using a variety of coping skills in daily life    Treatment Plan:  Patient has a current master individualized treatment plan.  See Epic treatment plan for more information.    Madyson Cristina, [facility]

## 2021-09-30 NOTE — GROUP NOTE
Process Group Note    PATIENT'S NAME: Francesca Griffin  MRN:   [id number]  :   2000  ACCT. NUMBER: [id number]  DATE OF SERVICE: 21  START TIME:  2:00 PM  END TIME:  2:50 PM  FACILITATOR: Perla Garvin, [facility]; Adrianna Montoya LGSW  TOPIC:  Process Group    Diagnoses:   Principal DSM5 Diagnoses  (Sustained by DSM5 Criteria Listed Above):   296.33 (F33.2) Major Depressive Disorder, Recurrent Episode, Severe _ and With mixed features.  4. Other Diagnoses that is relevant to services:   300.01 (F41.0) Panic Disorder  300.00 (F41.9) Unspecified Anxiety Disorder.      [facility] Day Treatment  TRACK: 4B    NUMBER OF PARTICIPANTS: 8          Data:    Session content: At the start of this group, patients were invited to check in by identifying themselves, describing their current emotional status, and identifying issues to address in this group.   Area(s) of treatment focus addressed in this session included Symptom Management, Personal Safety and Community Resources/Discharge Planning.  Client reported feeling symptoms of anxiety and panic and became tearful throughout their check in.  Client reported needing to excuse themselves from group during the first hour after receiving a troubling text from their roommate about the state of their kitchen.  Client explained that they left to go clean because of a  passive aggressive and targeted  text message to all roommates that they clean their dishes.  Client reported having difficulty maintaining clean dishes to the expectation that their roommate requires.  Client reports being distressed over their roommates expectation to the point that they  eat food cold  to avoid dishes or making a mess cooking in the kitchen.     Group members validated client s feelings and offered communication strategies to use with their roommates. Client reported their family having similar expectations growing up to the point that client was unsure what level  of clean was seen as satisfactory to the parents.  Writer reminded client on the mental health symptoms contributing to their inability to clean the dishes and encouraged client to be open about any accommodations they needed.  Client did not report any suicidal ideation, plan or intent.      Therapeutic Interventions/Treatment Strategies:  Psychotherapist offered support, feedback and validation and reinforced use of skills. Treatment modalities used include Cognitive Behavioral Therapy. Interventions include Coping Skills: Promoted understanding of how and when to apply grounding strategies to reduce distress and increase presence in the moment, Emotions Management:  Discussed barriers to emotional regulation and Relationship Skills: Encouraged development and maintenance  of healthy boundaries.    Assessment:    Patient response:   Patient responded to session by accepting feedback, giving feedback, listening and being attentive    Possible barriers to participation / learning include: and no barriers identified    Health Issues:   None reported       Substance Use Review:   Substance Use: No active concerns identified.    Mental Status/Behavioral Observations  Appearance:   Appropriate   Eye Contact:   Good   Psychomotor Behavior: Normal   Attitude:   Cooperative  Interested Pleasant  Orientation:   All  Speech   Rate / Production: Normal    Volume:  Normal   Mood:    Anxious  Sad  Panicked  Affect:    Tearful  Thought Content:   Clear  Thought Form:  Coherent  Logical     Insight:    Good     Plan:     Safety Plan: No current safety concerns identified.  Recommended that patient call 911 or go to the local ED should there be a change in any of these risk factors.     Barriers to treatment: None identified    Patient Contracts (see media tab):  None    Substance Use: Not addressed in session     Continue or Discharge: Patient will continue in Adult Day Treatment (ADT)  as planned. Patient is likely to benefit  from learning and using skills as they work toward the goals identified in their treatment plan.      Adrianna Montoya, MANNY  September 30, 2021

## 2021-09-30 NOTE — GROUP NOTE
Psychotherapy Group Note    PATIENT'S NAME: Francesca Griffin  MRN:   [id number]  :   2000  ACCT. NUMBER: [id number]  DATE OF SERVICE: 21  START TIME:  1:00 PM  END TIME:  1:50 PM  FACILITATOR: Madyson Cristina LPCC; Adrianna Montoya LGSW  TOPIC: MH EBP Group: Coping Skills  [facility] Adult Mental Health Day Treatment  TRACK: 4B    NUMBER OF PARTICIPANTS: 8    Summary of Group / Topics Discussed:  Coping Skills: Meditation: Patients learned about meditation and explored how and when to utilize it to increase focus, reduce mental health symptoms, decrease physical tension, and improve mental well-being.  Approaches to meditation were presented as a means of increasing self-awareness. The benefits of various meditation practices were discussed, as well as barriers to utilization of this coping strategy.     Patient Session Goals / Objectives:    Understand the purpose and efficacy of using meditation modalities to reduce stress / symptoms.    Review / discuss situations in daily life that cause distress, where establishing a meditation routine or meditating as needed may improve functioning.      Verbalize understanding of how and when to apply grounding strategies to reduce distress and increase presence in the moment.    Practice meditation and address barriers to use in daily life.        Patient Participation / Response:  Moderately participated, sharing some personal reflections / insights and adequately adequately received / provided feedback with other participants.    Demonstrated understanding of topics discussed through group discussion and participation, Expressed understanding of the relevance / importance of coping skills at distressing times in life and Identified barriers to applying coping skills    Treatment Plan:  Patient has a current master individualized treatment plan.  See Epic treatment plan for more information.    MANNY Farfan

## 2021-09-30 NOTE — ADDENDUM NOTE
Encounter addended by: Adrianna Montoya LGSW on: 9/30/2021 5:33 PM   Actions taken: Flowsheet accepted

## 2021-10-04 ENCOUNTER — HOSPITAL ENCOUNTER (OUTPATIENT)
Dept: BEHAVIORAL HEALTH | Facility: CLINIC | Age: 21
End: 2021-10-04
Attending: PSYCHIATRY & NEUROLOGY
Payer: COMMERCIAL

## 2021-10-04 PROCEDURE — 90853 GROUP PSYCHOTHERAPY: CPT | Mod: GT

## 2021-10-04 NOTE — GROUP NOTE
Process Group Note    PATIENT'S NAME: Francesca Griffin  MRN:   [id number]  :   2000  ACCT. NUMBER: [id number]  DATE OF SERVICE: 10/04/21  START TIME:  1:00 PM  END TIME:  1:50 PM  FACILITATOR: Perla Garvin LICSW; Adrianna Montoya LGSW  TOPIC:  Process Group    Diagnoses:   Principal DSM5 Diagnoses  (Sustained by DSM5 Criteria Listed Above):   296.33 (F33.2) Major Depressive Disorder, Recurrent Episode, Severe _ and With mixed features.  4. Other Diagnoses that is relevant to services:   300.01 (F41.0) Panic Disorder  300.00 (F41.9) Unspecified Anxiety Disorder.      [facility] Day Treatment  TRACK: 4B    NUMBER OF PARTICIPANTS: 8                                      Service Modality:  Video Visit     Telemedicine Visit: The patient's condition can be safely assessed and treated via synchronous audio and visual telemedicine encounter.      Reason for Telemedicine Visit: Services only offered telehealth    Originating Site (Patient Location): Patient's home    Distant Site (Provider Location): Provider Remote Setting- Home Office    Consent:  The patient/guardian has verbally consented to: the potential risks and benefits of telemedicine (video visit) versus in person care; bill my insurance or make self-payment for services provided; and responsibility for payment of non-covered services.     Patient would like the video invitation sent by:  My Chart    Mode of Communication:  Video Conference via Medical Zoom    As the provider I attest to compliance with applicable laws and regulations related to telemedicine.            Data:    Session content: At the start of this group, patients were invited to check in by identifying themselves, describing their current emotional status, and identifying issues to address in this group.   Area(s) of treatment focus addressed in this session included Symptom Management, Personal Safety and Community Resources/Discharge Planning.  Client reported  feeling anxious that  things are slipping and then shit hits the fan .  Client further explained anxiety over recent awareness of overspending and using  anxiety brain  to wonder what situation they would be in if they didn t have parents to support them financially.  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  Client reported losing half their work due to a computer crash after client stayed up until 3am this morning working on it.  Client reported going on a  long walk  and that despite the school and personal stress, they were  able to find the good moments .  Writer and group members offered validation and acknowledged client s ability to see the positive in the situation.  Client did not report any suicidal ideation, plan or intent.      Therapeutic Interventions/Treatment Strategies:  Psychotherapist offered support, feedback and validation and reinforced use of skills. Treatment modalities used include Cognitive Behavioral Therapy. Interventions include Coping Skills: Discussed use of self-soothe skills to decrease distress in the body and Mindfulness: Encouraged a plan to use mindfulness skills in daily life.    Assessment:    Patient response:   Patient responded to session by accepting feedback, giving feedback and listening    Possible barriers to participation / learning include: and no barriers identified    Health Issues:   None reported       Substance Use Review:   Substance Use: No active concerns identified.    Mental Status/Behavioral Observations  Appearance:   Appropriate   Eye Contact:   Good   Psychomotor Behavior: Normal   Attitude:   Cooperative  Interested Pleasant  Orientation:   All  Speech   Rate / Production: Normal    Volume:  Normal   Mood:    Anxious   Affect:    Appropriate   Thought Content:   Clear  Thought Form:  Coherent  Logical     Insight:    Good     Plan:     Safety Plan: No current safety concerns identified.  Recommended that patient call  911 or go to the local ED should there be a change in any of these risk factors.     Barriers to treatment: None identified    Patient Contracts (see media tab):  None    Substance Use: Not addressed in session     Continue or Discharge: Patient will continue in Adult Day Treatment (ADT)  as planned. Patient is likely to benefit from learning and using skills as they work toward the goals identified in their treatment plan.      MANNY Farfan  October 4, 2021

## 2021-10-05 ENCOUNTER — MEDICAL CORRESPONDENCE (OUTPATIENT)
Dept: HEALTH INFORMATION MANAGEMENT | Facility: CLINIC | Age: 21
End: 2021-10-05
Payer: COMMERCIAL

## 2021-10-05 ENCOUNTER — TRANSFERRED RECORDS (OUTPATIENT)
Dept: HEALTH INFORMATION MANAGEMENT | Facility: CLINIC | Age: 21
End: 2021-10-05
Payer: COMMERCIAL

## 2021-10-05 ENCOUNTER — HOSPITAL ENCOUNTER (OUTPATIENT)
Dept: BEHAVIORAL HEALTH | Facility: CLINIC | Age: 21
End: 2021-10-05
Attending: PSYCHIATRY & NEUROLOGY
Payer: COMMERCIAL

## 2021-10-05 PROCEDURE — 90853 GROUP PSYCHOTHERAPY: CPT | Mod: 95 | Performed by: PSYCHOLOGIST

## 2021-10-05 PROCEDURE — 90853 GROUP PSYCHOTHERAPY: CPT | Mod: GT

## 2021-10-05 PROCEDURE — 90853 GROUP PSYCHOTHERAPY: CPT | Mod: 95 | Performed by: COUNSELOR

## 2021-10-05 NOTE — GROUP NOTE
Psychotherapy Group Note                                    Service Modality:  Video Visit     Telemedicine Visit: The patient's condition can be safely assessed and treated via synchronous audio and visual telemedicine encounter.      Reason for Telemedicine Visit: Patient has requested telehealth visit, Patient unable to travel, Patient convenience (e.g. access to timely appointments / distance to available provider), Patient lives in a designated Health Professional Shortage Area (HPSA), and Services only offered telehealth    Originating Site (Patient Location): Patient's home    Distant Site (Provider Location): [facility]: [facility], [facility]    Consent:  The patient/guardian has verbally consented to: the potential risks and benefits of telemedicine (video visit) versus in person care; bill my insurance or make self-payment for services provided; and responsibility for payment of non-covered services.     Patient would like the video invitation sent by:  My Chart    Mode of Communication:  Video Conference via Medical Zoom    As the provider I attest to compliance with applicable laws and regulations related to telemedicine.        PATIENT'S NAME: Farncesca Griffin  MRN:   [id number]  :   2000  ACCT. NUMBER: [id number]  DATE OF SERVICE: 10/05/21  START TIME:  3:00 PM  END TIME:  3:50 PM  FACILITATOR: Rubai Fuentes PsyD  TOPIC:  EBP Group: Behavioral Activation  [facility] Adult Mental Health Day Treatment  TRACK: 4B    NUMBER OF PARTICIPANTS: 6    Summary of Group / Topics Discussed:  Behavioral Activation: Activity Scheduling:Patients explored how they currently spend their time, and how specific behaviors impact thoughts and feelings.  The group explored the effect of negative and positive activities on mood states and thought patterns.  Patients identified activities that help to improve mood and thinking patterns, and developed a plan to implement positive activities  between sessions.      Patient Session Goals / Objectives:    Identify impact of current behaviors on thoughts and mood    Identify 2-3 behavioral changes that could have a positive impact on thoughts and mood    Prepare to make desired behavioral change: Create a change plan / activity schedule      Patient Participation / Response:  Fully participated with the group by sharing personal reflections / insights and openly received / provided feedback with other participants.    Expressed understanding of the relationship between behaviors, thoughts, and feelings    Treatment Plan:  Patient has a current master individualized treatment plan.  See Epic treatment plan for more information.    Brayan Blanca Psy., STEFANY,  Licensed Clinical Psychologist

## 2021-10-05 NOTE — GROUP NOTE
Psychotherapy Group Note    PATIENT'S NAME: Francesca Griffin  MRN:   [id number]  :   2000  ACCT. NUMBER: [id number]  DATE OF SERVICE: 10/05/21  START TIME:  2:00 PM  END TIME:  2:50 PM  FACILITATOR: Rupert Alcocer LMFT  TOPIC: MH EBP Group: Behavioral Activation  [facility] Adult Mental Health Day Treatment  TRACK: 4B    NUMBER OF PARTICIPANTS: 8    Summary of Group / Topics Discussed:  Behavioral Activation: Activity Scheduling:Patients explored how they currently spend their time, and how specific behaviors impact thoughts and feelings.  The group explored the effect of negative and positive activities on mood states and thought patterns.  Patients identified activities that help to improve mood and thinking patterns, and developed a plan to implement positive activities between sessions.      Patient Session Goals / Objectives:    Identify impact of current behaviors on thoughts and mood    Identify 2-3 behavioral changes that could have a positive impact on thoughts and mood    Prepare to make desired behavioral change: Create a change plan / activity schedule    Service Modality:  Video Visit     Telemedicine Visit: The patient's condition can be safely assessed and treated via synchronous audio and visual telemedicine encounter.      Reason for Telemedicine Visit: Services only offered telehealth and due to COVID-19.    Originating Site (Patient Location): Patient's home    Distant Site (Provider Location): Provider Remote Setting- Home Office    Consent:  The patient/guardian has verbally consented to: the potential risks and benefits of telemedicine (video visit) versus in person care; bill my insurance or make self-payment for services provided; and responsibility for payment of non-covered services.     Patient would like the video invitation sent by:  My Chart    Mode of Communication:  Video Conference via Medical Zoom    As the provider I attest to compliance with applicable laws and  regulations related to telemedicine.      Patient Participation / Response:  Fully participated with the group by sharing personal reflections / insights and openly received / provided feedback with other participants.    Demonstrated understanding of topics discussed through group discussion and participation, Shared experiences and challenges with making behavioral changes and Identified / Expressed personal readiness to make behavioral change    Treatment Plan:  Patient has a current master individualized treatment plan.  See Epic treatment plan for more information.    Rupert Alcocer, LUIS MFT
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
## 2021-10-07 ENCOUNTER — HOSPITAL ENCOUNTER (OUTPATIENT)
Dept: BEHAVIORAL HEALTH | Facility: CLINIC | Age: 21
End: 2021-10-07
Attending: PSYCHIATRY & NEUROLOGY
Payer: COMMERCIAL

## 2021-10-07 PROCEDURE — 90853 GROUP PSYCHOTHERAPY: CPT | Mod: GT

## 2021-10-07 NOTE — GROUP NOTE
Psychotherapy Group Note    PATIENT'S NAME: Francesca Griffin  MRN:   [id number]  :   2000  ACCT. NUMBER: [id number]  DATE OF SERVICE: 10/07/21  START TIME:  3:00 PM  END TIME:  3:50 PM  FACILITATOR: Hattie Virk  TOPIC:  EBP Group: DDP Relapse Prevention  [facility] Mental Health Day Treatment  TRACK: 4B                                      Service Modality:  Video Visit     Telemedicine Visit: The patient's condition can be safely assessed and treated via synchronous audio and visual telemedicine encounter.      Reason for Telemedicine Visit:  covid 19     Originating Site (Patient Location): Patient's home    Distant Site (Provider Location): Provider Remote Setting- Home Office    Consent:  The patient/guardian has verbally consented to: the potential risks and benefits of telemedicine (video visit) versus in person care; bill my insurance or make self-payment for services provided; and responsibility for payment of non-covered services.     Patient would like the video invitation sent by:  My Chart    Mode of Communication:  Video Conference via Medical Zoom    As the provider I attest to compliance with applicable laws and regulations related to telemedicine.          NUMBER OF PARTICIPANTS: 6    Summary of Group / Topics Discussed:  DDP Relapse Prevention: Observing and Describing Process of Relapse: Patients explored the process of relapse and what individual factors can contribute to relapse. This will assist patients in recognizing that relapse is a process that often begins well before the actual relapse. Patients discussed their own personal vulnerabilities, emotions, cravings, urges, situations, and thoughts that may lead to a relapse and what has led to past relapses.     Patient Session Goals / Objectives:    Verbalized understanding the importance of awareness of factors that contribute to relapse     Verbalized understanding that relapse is a process    Shared personal  vulnerabilities, thoughts, emotions, and situations that may lead to relapse     Described skills to manage factors that contribute to relapse         Patient Participation / Response:  Fully participated with the group by sharing personal reflections / insights and openly received / provided feedback with other participants.    Demonstrated understanding of topics discussed through group discussion and participation, Demonstrated understanding of utilizing relapse prevention skills to manage urges and maintain sobriety and Identified / Expressed personal readiness to utilize relapse prevention skills    Treatment Plan:  Patient has a current master individualized treatment plan.  See Epic treatment plan for more information.    Hattie Virk

## 2021-10-07 NOTE — GROUP NOTE
Psychotherapy Group Note    PATIENT'S NAME: Francesca Griffin  MRN:   [id number]  :   2000  ACCT. NUMBER: [id number]  DATE OF SERVICE: 10/07/21  START TIME:  1:00 PM  END TIME:  1:50 PM  FACILITATOR: Adrianna Montoya LGSW; Hattie Virk  TOPIC: MH EBP Group: Behavioral Activation  [facility] Adult Mental Health Day Treatment  TRACK: 4B    NUMBER OF PARTICIPANTS: 8    Summary of Group / Topics Discussed:  Behavioral Activation: [place] Ahead: {Patients identified situations that prompt unwanted and unhelpful emotions / thoughts / behaviors.   Patients discussed how to problem solve by proactively using coping skills in potentially difficult situations. Components included describing the situation, brainstorming coping skills, imagining how scenario can/will unfold, rehearsing the action plan, and practicing relaxation to follow.  Patients practiced using these skills to reduce symptom distress and increase effective coping  behaviors.      Patient Session Goals / Objectives:    Identify difficult situation(s), and gain proficiency with alternative behaviors / skills to problem solve.    Increase confidence using coping skills through group practice in session.    Receive and provide feedback regarding skill development.    Apply coping skills in daily life situations.                                      Service Modality:  Video Visit     Telemedicine Visit: The patient's condition can be safely assessed and treated via synchronous audio and visual telemedicine encounter.      Reason for Telemedicine Visit: Services only offered telehealth    Originating Site (Patient Location): Patient's home    Distant Site (Provider Location): Provider Remote Setting- Home Office    Consent:  The patient/guardian has verbally consented to: the potential risks and benefits of telemedicine (video visit) versus in person care; bill my insurance or make self-payment for services provided; and responsibility for payment of  non-covered services.     Patient would like the video invitation sent by:  My Chart    Mode of Communication:  Video Conference via Medical Zoom    As the provider I attest to compliance with applicable laws and regulations related to telemedicine.            Patient Participation / Response:  Fully participated with the group by sharing personal reflections / insights and openly received / provided feedback with other participants.    Demonstrated understanding of topics discussed through group discussion and participation, Expressed understanding of the relationship between behaviors, thoughts, and feelings and Shared experiences and challenges with making behavioral changes    Treatment Plan:  Patient has a current master individualized treatment plan.  See Epic treatment plan for more information.    Adrianna Montoya LGSW
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
## 2021-10-10 ENCOUNTER — HEALTH MAINTENANCE LETTER (OUTPATIENT)
Age: 21
End: 2021-10-10

## 2021-10-11 ENCOUNTER — HOSPITAL ENCOUNTER (OUTPATIENT)
Dept: BEHAVIORAL HEALTH | Facility: CLINIC | Age: 21
End: 2021-10-11
Attending: PSYCHIATRY & NEUROLOGY
Payer: COMMERCIAL

## 2021-10-11 PROCEDURE — 90853 GROUP PSYCHOTHERAPY: CPT | Mod: GT,95

## 2021-10-11 NOTE — GROUP NOTE
Psychotherapy Group Note    PATIENT'S NAME: Francesca Griffin  MRN:   [id number]  :   2000  ACCT. NUMBER: [id number]  DATE OF SERVICE: 10/11/21  START TIME:  2:00 PM  END TIME:  2:50 PM  FACILITATOR: Perla Garvin LICSW; Adrianna Montoya LGSW  TOPIC:  EBP Group: Emotions Management  [facility] Adult Mental Health Day Treatment  TRACK: 4B    NUMBER OF PARTICIPANTS: 8    Summary of Group / Topics Discussed:  Emotions Management: Model of Emotions: Patients were introduced to the cyclical model of emotions.  Explored emotions are shaped by different events and one s interpretation of events.  Group discussed how emotions begin with an event, followed by one s interpretation, followed by associated feelings.  Discussion included a review of personal urges and actions that can/do follow an emotional experience in the patient s life, and the end results.    Patient Session Goals / Objectives:    Demonstrate understanding of types various emotions.    Identify and discuss specific emotions and when they occur; understand triggers.    Identify individual emotions and physical sensations that accompany them.    Discuss urges and actions, and how to influence the intensity of emotional reactions and disrupt the cycle.      Discuss barriers to emotional regulation.    Choose 1-2 strategies to assist with emotional response to potentially distressing situations.                                      Service Modality:  Video Visit     Telemedicine Visit: The patient's condition can be safely assessed and treated via synchronous audio and visual telemedicine encounter.      Reason for Telemedicine Visit: Services only offered telehealth    Originating Site (Patient Location): Patient's home    Distant Site (Provider Location): Provider Remote Setting- Home Office    Consent:  The patient/guardian has verbally consented to: the potential risks and benefits of telemedicine (video visit) versus in person care; bill my  insurance or make self-payment for services provided; and responsibility for payment of non-covered services.     Patient would like the video invitation sent by:  My Chart    Mode of Communication:  Video Conference via Medical Zoom    As the provider I attest to compliance with applicable laws and regulations related to telemedicine.            Patient Participation / Response:  Fully participated with the group by sharing personal reflections / insights and openly received / provided feedback with other participants.    Demonstrated understanding of topics discussed through group discussion and participation, Expressed understanding of the relevance / importance of emotions management skills at distressing times in life and Self-aware of experiences with difficult emotions, and strategies to employ to manage them    Treatment Plan:  Patient has a current master individualized treatment plan.  See Epic treatment plan for more information.    Adrianna Montoya LGSW

## 2021-10-11 NOTE — GROUP NOTE
Process Group Note    PATIENT'S NAME: Francesca Griffin  MRN:   [id number]  :   2000  ACCT. NUMBER: [id number]  DATE OF SERVICE: 10/11/21  START TIME:  1:00 PM  END TIME:  1:50 PM  FACILITATOR: Perla Garvin LICSW; Adrianna Montoya LGSW  TOPIC:  Process Group    Diagnoses:   Principal DSM5 Diagnoses  (Sustained by DSM5 Criteria Listed Above):   296.33 (F33.2) Major Depressive Disorder, Recurrent Episode, Severe _ and With mixed features.  4. Other Diagnoses that is relevant to services:   300.01 (F41.0) Panic Disorder  300.00 (F41.9) Unspecified Anxiety Disorder.      [facility] Day Treatment  TRACK: 4B    NUMBER OF PARTICIPANTS: 8                                      Service Modality:  Video Visit     Telemedicine Visit: The patient's condition can be safely assessed and treated via synchronous audio and visual telemedicine encounter.      Reason for Telemedicine Visit: Services only offered telehealth    Originating Site (Patient Location): Patient's home    Distant Site (Provider Location): Provider Remote Setting- Home Office    Consent:  The patient/guardian has verbally consented to: the potential risks and benefits of telemedicine (video visit) versus in person care; bill my insurance or make self-payment for services provided; and responsibility for payment of non-covered services.     Patient would like the video invitation sent by:  My Chart    Mode of Communication:  Video Conference via Medical Zoom    As the provider I attest to compliance with applicable laws and regulations related to telemedicine.                Data:    Session content: At the start of this group, patients were invited to check in by identifying themselves, describing their current emotional status, and identifying issues to address in this group.   Area(s) of treatment focus addressed in this session included Symptom Management, Personal Safety and Community Resources/Discharge Planning.  Client  reported having  a lot going on in their head  and was unsure of where to focus.  Client also reported feeling  unaware of time  and that they don t feel like they can trust their memory.  Client reported recently starting to see an individual therapist to work through their    individual stuff .  Client reports liking their therapist so far. Client reported feeling bad explaining that that  it s stupid that depression makes you not want to take care of yourself but the only thing that helps is to help take care of yourself .  Writer validated client s frustration and asked group members to share thoughts on how to apply skills to overcome this barrier.  Client accepted feedback and expressed feelings of exhaustion they have related to applying these skills.   I am constantly needing to correct my brain .  Client also reported wanting to go to a job fair because of anxiety over planning life past college.  Group members normalized and validated client s feelings of anxiety.  Client stated that they have a meeting with their  tomorrow and expressed their willingness to lean on them for career support.  Client did not report any suicidal ideation, plan or intent.      Therapeutic Interventions/Treatment Strategies:  Psychotherapist offered support, feedback and validation and reinforced use of skills. Treatment modalities used include Cognitive Behavioral Therapy. Interventions include Behavioral Activation: Encouraged strategies to reduce individual procrastination and increase motivation by increasing goal-directed activities to enhance mood and reduce symptoms. and Coping Skills: Reviewed patients current calming practices and discussed a more formal way of practicing and accessing skills.    Assessment:    Patient response:   Patient responded to session by accepting feedback, giving feedback and listening    Possible barriers to participation / learning include: and no barriers  identified    Health Issues:   None reported       Substance Use Review:   Substance Use: No active concerns identified.    Mental Status/Behavioral Observations  Appearance:   Appropriate   Eye Contact:   Good   Psychomotor Behavior: Normal   Attitude:   Cooperative  Interested Friendly Pleasant  Orientation:   All  Speech   Rate / Production: Normal    Volume:  Normal   Mood:    Anxious  Depressed   Affect:    Appropriate   Thought Content:   Clear  Thought Form:  Coherent  Logical     Insight:    Good     Plan:     Safety Plan: No current safety concerns identified.  Recommended that patient call 911 or go to the local ED should there be a change in any of these risk factors.     Barriers to treatment: None identified    Patient Contracts (see media tab):  None    Substance Use: Not addressed in session     Continue or Discharge: Patient will continue in Adult Day Treatment (ADT)  as planned. Patient is likely to benefit from learning and using skills as they work toward the goals identified in their treatment plan.      MANNY Farfan  October 11, 2021

## 2021-10-11 NOTE — GROUP NOTE
Psychotherapy Group Note    PATIENT'S NAME: Francesca Griffin  MRN:   [id number]  :   2000  ACCT. NUMBER: [id number]  DATE OF SERVICE: 10/11/21  START TIME:  3:00 PM  END TIME:  3:50 PM  FACILITATOR: Hattie Virk  TOPIC: MH EBP Group: Behavioral Activation  [facility] Mental Health Day Treatment  TRACK: 4B                                      Service Modality:  Video Visit     Telemedicine Visit: The patient's condition can be safely assessed and treated via synchronous audio and visual telemedicine encounter.      Reason for Telemedicine Visit:  covid 19    Originating Site (Patient Location): Patient's home    Distant Site (Provider Location): Provider Remote Setting- Home Office    Consent:  The patient/guardian has verbally consented to: the potential risks and benefits of telemedicine (video visit) versus in person care; bill my insurance or make self-payment for services provided; and responsibility for payment of non-covered services.     Patient would like the video invitation sent by:  My Chart    Mode of Communication:  Video Conference via Medical Zoom    As the provider I attest to compliance with applicable laws and regulations related to telemedicine.         NUMBER OF PARTICIPANTS: 7    Summary of Group / Topics Discussed:  Behavioral Activation: Behavior Chain Analysis: Patients explored the steps leading up to identified unwanted behaviors, and ways to replace them with more effective behaviors. Patients examined factors contributing to unwanted behaviors, with a goal of determining ways to interrupt the unhealthy patterns.    Patient Session Goals / Objectives:    Identify thoughts, feelings, and actions that contribute to unwanted behaviors    Identify thoughts, feelings, and actions that contribute to more effective/healthy and desired behaviors    Make plans to track and implement changes and share experiences in group    Identify personal barriers to change      Patient  Participation / Response:  Fully participated with the group by sharing personal reflections / insights and openly received / provided feedback with other participants.    Demonstrated understanding of topics discussed through group discussion and participation, Expressed understanding of the relationship between behaviors, thoughts, and feelings and Shared experiences and challenges with making behavioral changes    Treatment Plan:  Patient has a current master individualized treatment plan.  See Epic treatment plan for more information.    Hattie Virk

## 2021-10-12 ENCOUNTER — HOSPITAL ENCOUNTER (OUTPATIENT)
Dept: BEHAVIORAL HEALTH | Facility: CLINIC | Age: 21
End: 2021-10-12
Attending: PSYCHIATRY & NEUROLOGY
Payer: COMMERCIAL

## 2021-10-12 PROCEDURE — 90853 GROUP PSYCHOTHERAPY: CPT | Mod: GT,95

## 2021-10-12 NOTE — GROUP NOTE
Process Group Note    PATIENT'S NAME: Francesca Griffin  MRN:   [id number]  :   2000  ACCT. NUMBER: [id number]  DATE OF SERVICE: 10/12/21  START TIME:  1:00 PM  END TIME:  1:50 PM  FACILITATOR: Perla Garvin LICSW; Adrianna Montoya LGSW  TOPIC:  Process Group    Diagnoses:   Principal DSM5 Diagnoses  (Sustained by DSM5 Criteria Listed Above):   296.33 (F33.2) Major Depressive Disorder, Recurrent Episode, Severe _ and With mixed features.  4. Other Diagnoses that is relevant to services:   300.01 (F41.0) Panic Disorder  300.00 (F41.9) Unspecified Anxiety Disorder.      [facility] Day Treatment  TRACK: 4B    NUMBER OF PARTICIPANTS: 9                                      Service Modality:  Video Visit     Telemedicine Visit: The patient's condition can be safely assessed and treated via synchronous audio and visual telemedicine encounter.      Reason for Telemedicine Visit: Services only offered telehealth    Originating Site (Patient Location): Patient's home    Distant Site (Provider Location): Provider Remote Setting- Home Office    Consent:  The patient/guardian has verbally consented to: the potential risks and benefits of telemedicine (video visit) versus in person care; bill my insurance or make self-payment for services provided; and responsibility for payment of non-covered services.     Patient would like the video invitation sent by:  My Chart    Mode of Communication:  Video Conference via Medical Zoom    As the provider I attest to compliance with applicable laws and regulations related to telemedicine.                Data:    Session content: At the start of this group, patients were invited to check in by identifying themselves, describing their current emotional status, and identifying issues to address in this group.   Area(s) of treatment focus addressed in this session included Symptom Management, Personal Safety and Community Resources/Discharge Planning.  Client  reported that they were  not doing awesome  and referenced a panic attack they had in the third hour of group yesterday as the cause of their current dissociative symptoms.  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  Writer empathized with client s feelings and encouraged client to reframe them not attending the job fair as self-care.  Writer asked client what may have triggered the panic attack and client cited overall increase in general anxiety and stress over potentially seeing their ex-friend at an upcoming friend s birthday party.  Client also reported being triggered by talk of self-harm during the behavior chain topic.  Writer asked client how they were able to take care of themselves, to which client reported laying down after group and practicing centering strategies.  I did some circling of my palm .  Client reported also taking an as needed anti-anxiety medication (Lorazepam) and then went to sleep.  Writer and co-facilitator encouraged client to take care of themselves as needed in the next two hours of group and instructed them to send a message to the group if they need to turn their camera off or leave the group.  Client reported feeling good about returning to work this week. Client did not report any suicidal ideation, plan or intent.      Therapeutic Interventions/Treatment Strategies:  Psychotherapist offered support, feedback and validation and reinforced use of skills. Treatment modalities used include Cognitive Behavioral Therapy. Interventions include Cognitive Restructuring:  Explored impact of ineffective thoughts / distortions on mood and activity and Coping Skills: Discussed use of self-soothe skills to decrease distress in the body.    Assessment:    Patient response:   Patient responded to session by accepting feedback and listening    Possible barriers to participation / learning include: and no barriers  identified    Health Issues:   None reported       Substance Use Review:   Substance Use: No active concerns identified.    Mental Status/Behavioral Observations  Appearance:   Appropriate   Eye Contact:   Good   Psychomotor Behavior: Normal   Attitude:   Cooperative  Interested  Orientation:   All  Speech   Rate / Production: Normal    Volume:  Normal   Mood:    Anxious  Depressed   Affect:    Appropriate   Thought Content:   Clear  Thought Form:  Coherent  Logical     Insight:    Good     Plan:     Safety Plan: No current safety concerns identified.  Recommended that patient call 911 or go to the local ED should there be a change in any of these risk factors.     Barriers to treatment: None identified    Patient Contracts (see media tab):  None    Substance Use: Not addressed in session     Continue or Discharge: Patient will continue in Adult Day Treatment (ADT)  as planned. Patient is likely to benefit from learning and using skills as they work toward the goals identified in their treatment plan.      MANNY Farfan  October 12, 2021

## 2021-10-12 NOTE — GROUP NOTE
Psychotherapy Group Note    PATIENT'S NAME: Francesca Griffin  MRN:   [id number]  :   2000  ACCT. NUMBER: [id number]  DATE OF SERVICE: 10/12/21  START TIME:  2:00 PM  END TIME:  2:50 PM  FACILITATOR: Hattie Virk  TOPIC: MH EBP Group: Self-Awareness  [facility] Adult Mental Health Day Treatment  TRACK: 4B                                      Service Modality:  Video Visit     Telemedicine Visit: The patient's condition can be safely assessed and treated via synchronous audio and visual telemedicine encounter.      Reason for Telemedicine Visit:  covid 19     Originating Site (Patient Location): Patient's home    Distant Site (Provider Location): Provider Remote Setting- Home Office    Consent:  The patient/guardian has verbally consented to: the potential risks and benefits of telemedicine (video visit) versus in person care; bill my insurance or make self-payment for services provided; and responsibility for payment of non-covered services.     Patient would like the video invitation sent by:  My Chart    Mode of Communication:  Video Conference via Medical Zoom    As the provider I attest to compliance with applicable laws and regulations related to telemedicine.          NUMBER OF PARTICIPANTS: 9    Summary of Group / Topics Discussed:  Self-Awareness: Self-Compassion: Patients received overview of key concepts in developing self-compassion. Patients discussed mindfulness, self-kindness, and finding common humanity. Patients identified their current approach to problems in their lives and learned skills for increasing self-compassion. Patients identified ways they can put self-compassion skills into practice and problem solve barriers to application of skills.     Patient Session Goals / Objectives:    [place] components of self-compassion    Identify ways to practice self-compassion in daily life    Problem solve barriers to self-compassion practice      Patient Participation / Response:  Fully  participated with the group by sharing personal reflections / insights and openly received / provided feedback with other participants.    Demonstrated understanding of topics discussed through group discussion and participation, Demonstrated understanding of values, strengths, and challenges to learn about themselves and Identified / Expressed readiness to act intentionally, increase self-compassion, promote personal growth    Treatment Plan:  Patient has a current master individualized treatment plan.  See Epic treatment plan for more information.    Hattie Virk

## 2021-10-12 NOTE — GROUP NOTE
Psychotherapy Group Note    PATIENT'S NAME: Francesca Griffin  MRN:   [id number]  :   2000  ACCT. NUMBER: [id number]  DATE OF SERVICE: 10/12/21  START TIME:  3:00 PM  END TIME:  3:50 PM  FACILITATOR: Hattie Virk  TOPIC: MH EBP Group: [facility] Adult Mental Health Day Treatment  TRACK: 4B                                      Service Modality:  Video Visit     Telemedicine Visit: The patient's condition can be safely assessed and treated via synchronous audio and visual telemedicine encounter.      Reason for Telemedicine Visit:  covid 19     Originating Site (Patient Location): Patient's home    Distant Site (Provider Location): Provider Remote Setting- Home Office    Consent:  The patient/guardian has verbally consented to: the potential risks and benefits of telemedicine (video visit) versus in person care; bill my insurance or make self-payment for services provided; and responsibility for payment of non-covered services.     Patient would like the video invitation sent by:  My Chart    Mode of Communication:  Video Conference via Medical Zoom    As the provider I attest to compliance with applicable laws and regulations related to telemedicine.          NUMBER OF PARTICIPANTS: 9    Summary of Group / Topics Discussed:  Mindfulness: Mindfulness Experiential: Patients received an overview on what mindfulness is and how mindfulness can benefit general health, mental health symptoms, and stressors. The history of mindfulness, its application to mental health therapies, and key concepts were also discussed. Patients discussed current awareness, knowledge, and practice of mindfulness skills. Patients also discussed barriers to mindfulness practice.    Patient Session Goals / Objectives:    Demonstrated and verbalized understanding of key mindfulness concepts    Identified when/how to use mindfulness skills    Resolved barriers to practicing mindfulness skills    Identified plan to use  mindfulness skills in daily life       Patient Participation / Response:  Fully participated with the group by sharing personal reflections / insights and openly received / provided feedback with other participants.    Demonstrated understanding of topics discussed through group discussion and participation, Demonstrated understanding of mindfulness skills and benefits of practice and Identified / Expressed personal readiness to practice mindfulness skills    Treatment Plan:  Patient has a current master individualized treatment plan.  See Epic treatment plan for more information.    Hattie Virk

## 2021-10-14 ENCOUNTER — HOSPITAL ENCOUNTER (OUTPATIENT)
Dept: BEHAVIORAL HEALTH | Facility: CLINIC | Age: 21
End: 2021-10-14
Attending: PSYCHIATRY & NEUROLOGY
Payer: COMMERCIAL

## 2021-10-14 PROCEDURE — 90853 GROUP PSYCHOTHERAPY: CPT | Mod: GT,95

## 2021-10-14 PROCEDURE — 90853 GROUP PSYCHOTHERAPY: CPT | Mod: 95

## 2021-10-14 NOTE — GROUP NOTE
Psychotherapy Group Note    PATIENT'S NAME: Francesca Griffin  MRN:   [id number]  :   2000  ACCT. NUMBER: [id number]  DATE OF SERVICE: 10/14/21  START TIME:  1:00 PM  END TIME:  1:50 PM  FACILITATOR: Adrianna Montoya LGSW  TOPIC: MH EBP Group: Coping Skills  [facility] Adult Mental Health Day Treatment  TRACK: 4B    NUMBER OF PARTICIPANTS: 8    Summary of Group / Topics Discussed:  Coping Skills: Additional Coping Skills:  Patients discussed and practiced the PLEASE coping skill.  Reviewed the benefits of applying the aforementioned coping strategies.  Patients explored how these strategies might be applied to daily stressors or distressing situations.    Patient Session Goals / Objectives:    Understand the purpose and benefits of applying PLEASE coping strategies    Discussing the Mind-Body Connection (Taking Care of Your Mind by Taking Care of Your Body)    Address barriers to utilizing coping skills when in distress.                                      Service Modality:  Video Visit     Telemedicine Visit: The patient's condition can be safely assessed and treated via synchronous audio and visual telemedicine encounter.      Reason for Telemedicine Visit: Services only offered telehealth    Originating Site (Patient Location): Patient's home    Distant Site (Provider Location): Provider Remote Setting- Home Office    Consent:  The patient/guardian has verbally consented to: the potential risks and benefits of telemedicine (video visit) versus in person care; bill my insurance or make self-payment for services provided; and responsibility for payment of non-covered services.     Patient would like the video invitation sent by:  My Chart    Mode of Communication:  Video Conference via Medical Zoom    As the provider I attest to compliance with applicable laws and regulations related to telemedicine.           Patient Participation / Response:  Fully participated with the group by sharing personal  reflections / insights and openly received / provided feedback with other participants.    Demonstrated understanding of topics discussed through group discussion and participation, Expressed understanding of the relevance / importance of coping skills at distressing times in life and Demonstrated knowledge of when to consider using a variety of coping skills in daily life    Treatment Plan:  Patient has a current master individualized treatment plan.  See Epic treatment plan for more information.    Adrianna Montoya LGSW

## 2021-10-14 NOTE — GROUP NOTE
Psychotherapy Group Note    PATIENT'S NAME: Francesca Griffin  MRN:   [id number]  :   2000  ACCT. NUMBER: [id number]  DATE OF SERVICE: 10/14/21  START TIME:  3:00 PM  END TIME:  3:50 PM  FACILITATOR: Hattie Virk  TOPIC: MH EBP Group: Cognitive Restructuring  [facility] Adult Mental Health Day Treatment  TRACK: 4B                                      Service Modality:  Video Visit     Telemedicine Visit: The patient's condition can be safely assessed and treated via synchronous audio and visual telemedicine encounter.      Reason for Telemedicine Visit:  covid 19    Originating Site (Patient Location): Patient's home    Distant Site (Provider Location): Provider Remote Setting- Home Office    Consent:  The patient/guardian has verbally consented to: the potential risks and benefits of telemedicine (video visit) versus in person care; bill my insurance or make self-payment for services provided; and responsibility for payment of non-covered services.     Patient would like the video invitation sent by:  My Chart    Mode of Communication:  Video Conference via Medical Zoom    As the provider I attest to compliance with applicable laws and regulations related to telemedicine.         NUMBER OF PARTICIPANTS: 8    Summary of Group / Topics Discussed:  Cognitive Restructuring: Distortions: Patients received an overview of how to identify common cognitive distortions. Patients will explore alternatives to cognitive distortions and practice challenging their negative thought patterns. The goal is to help patients target modify ineffective thought patterns.     Patient Session Goals / Objectives:    Familiarized self with ineffective / unhealthy thoughts and how they develop.      Explored impact of ineffective thoughts / distortions on mood and activity    Formulated new neutral/positive alternatives to challenge less helpful / ineffective thoughts.    Practiced and plan to apply in daily  life               Patient Participation / Response:  Fully participated with the group by sharing personal reflections / insights and openly received / provided feedback with other participants.    Demonstrated understanding of topics discussed through group discussion and participation, Expressed understanding of the relationship between behaviors, thoughts, and feelings and Demonstrated knowledge of personal thought patterns and how they impact their mood and behavior.    Treatment Plan:  Patient has a current master individualized treatment plan.  See Epic treatment plan for more information.    Hattie Virk

## 2021-10-14 NOTE — GROUP NOTE
Process Group Note    PATIENT'S NAME: Francesca Griffin  MRN:   [id number]  :   2000  ACCT. NUMBER: [id number]  DATE OF SERVICE: 10/14/21  START TIME:  2:00 PM  END TIME:  2:50 PM  FACILITATOR: Adrianna Montoya LGSW; Perla Garvin [facility]  TOPIC:  Process Group    Diagnoses:   Principal DSM5 Diagnoses  (Sustained by DSM5 Criteria Listed Above):   296.33 (F33.2) Major Depressive Disorder, Recurrent Episode, Severe _ and With mixed features.  4. Other Diagnoses that is relevant to services:   300.01 (F41.0) Panic Disorder  300.00 (F41.9) Unspecified Anxiety Disorder.      [facility] Day Treatment  TRACK: 4B    NUMBER OF PARTICIPANTS: 8                                      Service Modality:  Video Visit     Telemedicine Visit: The patient's condition can be safely assessed and treated via synchronous audio and visual telemedicine encounter.      Reason for Telemedicine Visit: Services only offered telehealth    Originating Site (Patient Location): Patient's home    Distant Site (Provider Location): Provider Remote Setting- Home Office    Consent:  The patient/guardian has verbally consented to: the potential risks and benefits of telemedicine (video visit) versus in person care; bill my insurance or make self-payment for services provided; and responsibility for payment of non-covered services.     Patient would like the video invitation sent by:  My Chart    Mode of Communication:  Video Conference via Medical Zoom    As the provider I attest to compliance with applicable laws and regulations related to telemedicine.                Data:    Session content: At the start of this group, patients were invited to check in by identifying themselves, describing their current emotional status, and identifying issues to address in this group.   Area(s) of treatment focus addressed in this session included Symptom Management, Personal Safety and Community Resources/Discharge Planning.  Client  reported feeling symptoms of anxiety, panic and dissociation ever since a panic attack after Monday s group.  Client reported feeling like a  marionette  today and explained that the only times they feel able to  animate and get up  are when others are in the same room.  Client reported only responding to inertia outside of themselves and that there was no internal motivation. Client reported feeling frustration that they were getting  distinctly worse  and wondered whether they should take a break from school.  Writer echoed client s feelings of immobilization.  Client reported having an  increasingly long list of apologies  to give their professors due to missing class and not communicating.  Group members validated client s feelings and offered insight into their own experience.  Writer asked client if they had been able to turn in the accommodation request to the [facility] resource [place].  Client reported that they think they missed the deadline and that the accommodations  only go so far .  Client appeared tearful and writer assured client that they did not need to make a decision today.  Writer encouraged the use of self-soothing strategies.  Client offered the idea of going to [employer] as a self-soothing strategy. Client did not report any suicidal ideation, plan or intent.      Therapeutic Interventions/Treatment Strategies:  Psychotherapist offered support, feedback and validation and reinforced use of skills. Treatment modalities used include Cognitive Behavioral Therapy. Interventions include Coping Skills: Discussed use of self-soothe skills to decrease distress in the body and Addressed barriers to utilizing coping skills when in distress.    Assessment:    Patient response:   Patient responded to session by accepting feedback and listening    Possible barriers to participation / learning include: and no barriers identified    Health Issues:   None reported       Substance Use  Review:   Substance Use: No active concerns identified.    Mental Status/Behavioral Observations  Appearance:   Appropriate   Eye Contact:   Good   Psychomotor Behavior: Normal   Attitude:   Cooperative   Orientation:   All  Speech   Rate / Production: Normal    Volume:  Normal   Mood:    Anxious  Depressed  Panicked  Affect:    Tearful  Thought Content:   Clear  Thought Form:  Coherent  Logical     Insight:    Good     Plan:     Safety Plan: No current safety concerns identified.  Recommended that patient call 911 or go to the local ED should there be a change in any of these risk factors.     Barriers to treatment: None identified    Patient Contracts (see media tab):  None    Substance Use: Not addressed in session     Continue or Discharge: Patient will continue in Adult Day Treatment (ADT)  as planned. Patient is likely to benefit from learning and using skills as they work toward the goals identified in their treatment plan.      MANNY Farfan  October 14, 2021

## 2021-10-18 ENCOUNTER — HOSPITAL ENCOUNTER (OUTPATIENT)
Dept: BEHAVIORAL HEALTH | Facility: CLINIC | Age: 21
End: 2021-10-18
Attending: PSYCHIATRY & NEUROLOGY
Payer: COMMERCIAL

## 2021-10-18 PROCEDURE — 90853 GROUP PSYCHOTHERAPY: CPT | Mod: 95

## 2021-10-18 PROCEDURE — 90853 GROUP PSYCHOTHERAPY: CPT | Mod: GT,95

## 2021-10-18 PROCEDURE — 90853 GROUP PSYCHOTHERAPY: CPT | Mod: 95 | Performed by: COUNSELOR

## 2021-10-18 NOTE — GROUP NOTE
Psychotherapy Group Note    PATIENT'S NAME: Francesca Griffin  MRN:   [id number]  :   2000  ACCT. NUMBER: [id number]  DATE OF SERVICE: 10/18/21  START TIME:  2:00 PM  END TIME:  2:50 PM  FACILITATOR: Madyson Cristina LPCC  TOPIC: MH EBP Group: Coping Skills  [facility] Mental Health Day Treatment  TRACK: 4B    NUMBER OF PARTICIPANTS: 7    Summary of Group / Topics Discussed:  Coping Skills: Radical Acceptance: Patients learned radical acceptance as a way to tolerate heightened stress in the moment.  Patients identified situations that necessitate radical acceptance.  They focused on applying acceptance of the moment to tolerate difficult emotions and events. Patients discussed how to distinguish when this can be useful in their lives and when other skills are more relevant or helpful.    Patient Session Goals / Objectives:    Understand that some amount of pain exists for each of us in our lives    Process the difficulty of acceptance in our lives and benefits of radical acceptance to mood and functioning.    Demonstrate understanding of when to use the radical acceptance to tolerate distress by providing examples of situations where this could be applied.    Identify barriers to applying radical acceptance to difficult situations and explore strategies to overcome them                                      Service Modality:  Video Visit     Telemedicine Visit: The patient's condition can be safely assessed and treated via synchronous audio and visual telemedicine encounter.      Reason for Telemedicine Visit: Services only offered telehealth    Originating Site (Patient Location): Patient's home    Distant Site (Provider Location): Provider Remote Setting- Home Office    Consent:  The patient/guardian has verbally consented to: the potential risks and benefits of telemedicine (video visit) versus in person care; bill my insurance or make self-payment for services provided; and responsibility for  payment of non-covered services.     Patient would like the video invitation sent by:  My Chart    Mode of Communication:  Video Conference via Medical Zoom    As the provider I attest to compliance with applicable laws and regulations related to telemedicine.            Patient Participation / Response:  Fully participated with the group by sharing personal reflections / insights and openly received / provided feedback with other participants.    Demonstrated understanding of topics discussed through group discussion and participation, Expressed understanding of the relevance / importance of coping skills at distressing times in life and Demonstrated knowledge of when to consider using a variety of coping skills in daily life    Treatment Plan:  Patient has a current master individualized treatment plan.  See Epic treatment plan for more information.    Madyson Cristina, [facility]

## 2021-10-18 NOTE — GROUP NOTE
Process Group Note    PATIENT'S NAME: Francesca Griffin  MRN:   [id number]  :   2000  ACCT. NUMBER: [id number]  DATE OF SERVICE: 10/18/21  START TIME:  1:00 PM  END TIME:  1:50 PM  FACILITATOR: Hattie Virk  TOPIC:  Process Group    Diagnoses:  296.33 (F33.2) Major Depressive Disorder, Recurrent Episode, Severe _ and With mixed features.  4. Other Diagnoses that is relevant to services:   300.01 (F41.0) Panic Disorder  300.00 (F41.9) Unspecified Anxiety Disorder      [facility] Mental Health Day Treatment  TRACK: 4B                                      Service Modality:  Video Visit     Telemedicine Visit: The patient's condition can be safely assessed and treated via synchronous audio and visual telemedicine encounter.      Reason for Telemedicine Visit:  covid 19    Originating Site (Patient Location): Patient's home    Distant Site (Provider Location): Provider Remote Setting- Home Office    Consent:  The patient/guardian has verbally consented to: the potential risks and benefits of telemedicine (video visit) versus in person care; bill my insurance or make self-payment for services provided; and responsibility for payment of non-covered services.     Patient would like the video invitation sent by:  My Chart    Mode of Communication:  Video Conference via Medical Zoom    As the provider I attest to compliance with applicable laws and regulations related to telemedicine.          NUMBER OF PARTICIPANTS: 8          Data:    Session content: At the start of this group, patients were invited to check in by identifying themselves, describing their current emotional status, and identifying issues to address in this group.   Area(s) of treatment focus addressed in this session included Symptom Management, Personal Safety, Develop / Improve Independent Living Skills and Develop Socialization / Interpersonal Relationship Skills.  Francesca reported having a rough week last week but is a bit better  now.  THey reported sleeping a lot and stress cleaning.  THey reported knowing they let some balls drop last week and are working on picking them up now.  They reported feeling unsure if they should drop classes and move back to [place] with parents.  They feel they have to wear a mask there and cant emote.  They gave self credit for being gentle with self over difficult week.      Therapeutic Interventions/Treatment Strategies:  Psychotherapist offered support, feedback and validation and reinforced use of skills. Treatment modalities used include Motivational Interviewing, Cognitive Behavioral Therapy and Dialectical Behavioral Therapy. Validated and normalized.  Highlighted and reinforced skills used; connected to positive outcomes.  Provided additional skill suggestions.  Aided in creating a plan to help work towards goals.          Assessment:    Patient response:   Patient responded to session by accepting feedback, giving feedback, listening, focusing on goals, being attentive and accepting support    Possible barriers to participation / learning include: and no barriers identified    Health Issues:   None reported       Substance Use Review:   Substance Use: No active concerns identified.    Mental Status/Behavioral Observations  Appearance:   Appropriate   Eye Contact:   Good   Psychomotor Behavior: Normal   Attitude:   Cooperative   Orientation:   All  Speech   Rate / Production: Normal    Volume:  Normal   Mood:    Anxious  Depressed   Affect:    Appropriate   Thought Content:   Rumination  Thought Form:  Coherent  Logical     Insight:    Good     Plan:     Safety Plan: No current safety concerns identified.  Recommended that patient call 911 or go to the local ED should there be a change in any of these risk factors.     Barriers to treatment: None identified    Patient Contracts (see media tab):  None    Substance Use: Not addressed in session     Continue or Discharge: Patient will continue in  Adult Day Treatment (ADT)  as planned. Patient is likely to benefit from learning and using skills as they work toward the goals identified in their treatment plan.      Hattie Virk  October 18, 2021

## 2021-10-18 NOTE — GROUP NOTE
Psychotherapy Group Note    PATIENT'S NAME: Francesca Griffin  MRN:   [id number]  :   2000  ACCT. NUMBER: [id number]  DATE OF SERVICE: 10/18/21  START TIME:  3:00 PM  END TIME:  3:50 PM  FACILITATOR: Hattie Virk  TOPIC: MH EBP Group: Cognitive Restructuring  [facility] Adult Mental Health Day Treatment  TRACK: 4B                                      Service Modality:  Video Visit     Telemedicine Visit: The patient's condition can be safely assessed and treated via synchronous audio and visual telemedicine encounter.      Reason for Telemedicine Visit:  covid 19     Originating Site (Patient Location): Patient's home    Distant Site (Provider Location): Provider Remote Setting- Home Office    Consent:  The patient/guardian has verbally consented to: the potential risks and benefits of telemedicine (video visit) versus in person care; bill my insurance or make self-payment for services provided; and responsibility for payment of non-covered services.     Patient would like the video invitation sent by:  My Chart    Mode of Communication:  Video Conference via Medical Zoom    As the provider I attest to compliance with applicable laws and regulations related to telemedicine.          NUMBER OF PARTICIPANTS: 7    Summary of Group / Topics Discussed:  Cognitive Restructuring: Distortions: Patients received an overview of how to identify common cognitive distortions. Patients will explore alternatives to cognitive distortions and practice challenging their negative thought patterns. The goal is to help patients target modify ineffective thought patterns.     Patient Session Goals / Objectives:    Familiarized self with ineffective / unhealthy thoughts and how they develop.      Explored impact of ineffective thoughts / distortions on mood and activity    Formulated new neutral/positive alternatives to challenge less helpful / ineffective thoughts.    Practiced and plan to apply in daily  life               Patient Participation / Response:  Fully participated with the group by sharing personal reflections / insights and openly received / provided feedback with other participants.    Demonstrated understanding of topics discussed through group discussion and participation, Expressed understanding of the relationship between behaviors, thoughts, and feelings and Demonstrated knowledge of personal thought patterns and how they impact their mood and behavior.    Treatment Plan:  Patient has a current master individualized treatment plan.  See Epic treatment plan for more information.    Hattie Virk

## 2021-10-19 ENCOUNTER — HOSPITAL ENCOUNTER (OUTPATIENT)
Dept: BEHAVIORAL HEALTH | Facility: CLINIC | Age: 21
End: 2021-10-19
Attending: PSYCHIATRY & NEUROLOGY
Payer: COMMERCIAL

## 2021-10-19 PROCEDURE — 90853 GROUP PSYCHOTHERAPY: CPT | Mod: 95

## 2021-10-19 PROCEDURE — 90832 PSYTX W PT 30 MINUTES: CPT | Mod: 95 | Performed by: PSYCHOLOGIST

## 2021-10-19 PROCEDURE — 90853 GROUP PSYCHOTHERAPY: CPT | Mod: GT,95

## 2021-10-19 PROCEDURE — 90832 PSYTX W PT 30 MINUTES: CPT | Mod: GT,95 | Performed by: PSYCHOLOGIST

## 2021-10-19 RX ORDER — BUPROPION HYDROCHLORIDE 100 MG/1
100 TABLET ORAL 2 TIMES DAILY
COMMUNITY
End: 2021-11-10 | Stop reason: DRUGHIGH

## 2021-10-19 ASSESSMENT — ANXIETY QUESTIONNAIRES
6. BECOMING EASILY ANNOYED OR IRRITABLE: MORE THAN HALF THE DAYS
7. FEELING AFRAID AS IF SOMETHING AWFUL MIGHT HAPPEN: MORE THAN HALF THE DAYS
IF YOU CHECKED OFF ANY PROBLEMS ON THIS QUESTIONNAIRE, HOW DIFFICULT HAVE THESE PROBLEMS MADE IT FOR YOU TO DO YOUR WORK, TAKE CARE OF THINGS AT HOME, OR GET ALONG WITH OTHER PEOPLE: EXTREMELY DIFFICULT
GAD7 TOTAL SCORE: 18
1. FEELING NERVOUS, ANXIOUS, OR ON EDGE: NEARLY EVERY DAY
3. WORRYING TOO MUCH ABOUT DIFFERENT THINGS: NEARLY EVERY DAY
5. BEING SO RESTLESS THAT IT IS HARD TO SIT STILL: NEARLY EVERY DAY
2. NOT BEING ABLE TO STOP OR CONTROL WORRYING: MORE THAN HALF THE DAYS

## 2021-10-19 ASSESSMENT — PATIENT HEALTH QUESTIONNAIRE - PHQ9
5. POOR APPETITE OR OVEREATING: NEARLY EVERY DAY
SUM OF ALL RESPONSES TO PHQ QUESTIONS 1-9: 24

## 2021-10-19 NOTE — GROUP NOTE
Psychotherapy Group Note    PATIENT'S NAME: Francesca Griffin  MRN:   [id number]  :   2000  ACCT. NUMBER: [id number]  DATE OF SERVICE: 10/19/21  START TIME:  3:00 PM  END TIME:  3:50 PM  FACILITATOR: Hattie Virk  TOPIC: MH EBP Group: Symptom Awareness  [facility] Mental Health Day Treatment  TRACK: 4B                                      Service Modality:  Video Visit     Telemedicine Visit: The patient's condition can be safely assessed and treated via synchronous audio and visual telemedicine encounter.      Reason for Telemedicine Visit:  covid 19    Originating Site (Patient Location): Patient's home    Distant Site (Provider Location): Provider Remote Setting- Home Office    Consent:  The patient/guardian has verbally consented to: the potential risks and benefits of telemedicine (video visit) versus in person care; bill my insurance or make self-payment for services provided; and responsibility for payment of non-covered services.     Patient would like the video invitation sent by:  My Chart    Mode of Communication:  Video Conference via Medical Zoom    As the provider I attest to compliance with applicable laws and regulations related to telemedicine.         NUMBER OF PARTICIPANTS: 7    Summary of Group / Topics Discussed:  Symptom Awareness: Mood Disorders: Patients received a general overview of mood disorders including depressive disorders, anxiety disorders, and bipolar disorders and how it relates to their current symptoms. The purpose is to promote understanding of their diagnoses and how it impacts their functioning. Patients reviewed their current awareness of symptoms and diagnoses. Patients received information regarding diagnoses, etiology, cultural, and environmental factors as well as impact on functioning.     Patient Session Goals / Objectives:    Discussed patient individual symptoms and experiences    Reviewed diagnostic criteria and etiology of diagnoses          Patient Participation / Response:  Fully participated with the group by sharing personal reflections / insights and openly received / provided feedback with other participants.    Demonstrated understanding of topics discussed through group discussion and participation, Demonstrated understanding of how information regarding symptoms can assist in management of symptoms and Identified / Expressed personal readiness to increase awareness of symptoms and apply skills as necessary    Treatment Plan:  Patient has a current master individualized treatment plan.  See Epic treatment plan for more information.    Hattie Virk

## 2021-10-19 NOTE — PROGRESS NOTES
[facility] Adult Mental Health Day Treatment  TRACK: 4B    PATIENT'S NAME: Francesca Griffin  MRN:   [id number]  :   2000  ACCT. NUMBER: [id number]  DATE OF SERVICE: 10/19/21   START TIME: 1530  END TIME: 1600      Telemedicine Visit: The patient's condition can be safely assessed and treated via synchronous audio and visual telemedicine encounter.      Reason for Telemedicine Visit: Patient unable to travel due to COVID 19    Originating Site (Patient Location): Patient's home    Distant Site (Provider Location): Provider Remote Setting    Consent:  The patient/guardian has verbally consented to: the potential risks and benefits of telemedicine (video visit) versus in person care; bill my insurance or make self-payment for services provided; and responsibility for payment of non-covered services.     Mode of Communication:  Video Conference via irisnote    As the provider I attest to compliance with applicable laws and regulations related to telemedicine.    ATTENDEES: Patient and this author    Rating Scales:    PHQ9:    PHQ-9 SCORE 2021 10/19/2021   PHQ-9 Total Score MyChart 23 (Severe depression) -   PHQ-9 Total Score 23 24   Some encounter information is confidential and restricted. Go to Review Flowsheets activity to see all data.   ;    GAD7:    LLOYD-7 SCORE 2021 10/19/2021   Total Score 16 (severe anxiety) -   Total Score 16 18   Some encounter information is confidential and restricted. Go to Review Flowsheets activity to see all data.     CGI:     First:Considering your total clinical experience with this particular patient population, how severe are the patient's symptoms at this time?: 4 (10/19/2021  3:45 PM)  ;  Most recentCompared to the patient's condition at the START of treatment, this patient's condition is: 4 (10/19/2021  3:45 PM)        DATA    Treatment Objective(s) Addressed in This Session:  The purpose of today's call is for this author to provide oversight of patient's care  "while receiving program services. Specific treatment goals addressed included personal safety, symptoms stabilization and management, wellness and mental health, and community resources/discharge planning.     Patient identified the following initial areas for treatment focus: lower the anxiety and depression so that migraines decrease, and Francesca can finish classes in college. Francesca reported a \"downward trend\" in mood and 10-15 migraines each month.     Panic attacks in the past and ongoing anxiety with negative, worrisome  Thoughts, feeling on edge, unable to control worry, worry about many things, difficulty relaxing, restlessness, feeling annoyed, headaches, fearfulness of the future and loss of focus and concentration.    Francesca reported reported low interest, low energy, motivation, psychomotor slowing, passive suicidal thoughts, with no intent or plan, feeling like a burden to others, fatigue, interrupted sleep, and low appetite.        Francesca reported a dissociative episode reported recently and reported that it was associated with a panic attack.    Migraines occur every day or several times a week, and it has been lower. [facility] referred her to neurology at [facility].    Gender Dysphoria: Francesca reported questions about sexual identity and was [facility], [facility].    Current Stressors / Issues:  During today's visit, this author identified herself, the purpose of the visit and her role of provider oversight while patient is participating in the program. In addition, this author assessed the patient's mental health diagnoses and symptoms. The patient reports they currently manage mental health symptoms by Hali Brasher,   Dr. Jessenia Yadav, PCP  and Dr. Celine Ahuja .  Patient denies relief from their presenting issue migraines, depression, and anxiety .     Patient reports  effectiveness of current medications managed by: Hali Yadav, PCP< and Dr." Celine Ahuja . Patient reports that their medications have changed. More specifically, they identified that they have added Wellbutrin. Patient reports that their current medication provider is aware of these changes.     Patient reports current meds as:   Outpatient Medications Marked as Taking for the 10/19/21 encounter (Hospital Encounter) with Rubia Fuentes PsyD   Medication Sig     buPROPion (WELLBUTRIN) 100 MG tablet Take 100 mg by mouth 2 times daily     DULoxetine (CYMBALTA) 60 MG capsule Take 60 mg by mouth 2 times daily 200mg twice a day  300mg once a day     gabapentin (NEURONTIN) 300 MG/6ML oral solution Take 300 mg by mouth 3 times daily      hydrOXYzine (VISTARIL) 25 MG capsule Take 25 mg by mouth At Bedtime     ondansetron (ZOFRAN) 8 MG tablet Take 8 mg by mouth every 8 hours as needed for nausea     propranolol ER (INDERAL LA) 60 MG 24 hr capsule Take by mouth daily     rizatriptan (MAXALT-MLT) 5 MG ODT Take 5 mg by mouth at onset of headache for migraine       Medication Adherence:  Patient reports taking prescribed medications as prescribed.    Patient  reports  that they plan to continue to work with their individual therapist and/or medication provider. They were urged to continue services.    Patient identified that continuing in the 4B track would be beneficial for their care moving forward.     Progress on Treatment Objective(s) / Homework:  Not applicable to current visit    Therapeutic Interventions/Treatment Strategies:  Support, Feedback, Safety Assessments, Structured Activity, Problem Solving, Clarification and Education    Response to Treatment Strategies:  Accepted Feedback, Listened and Attentive    Changes in Health Issues:  None reported    Chemical Use Review:      She reported alcohol use on Saturday socially and drinks about 1 -2  Drinks, and marijuana once a month, or once every few months a hit or a bowl    Assessment: Current Emotional / Mental Status (status of significant  symptoms):    Risk status (Self / Other harm or suicidal ideation)  Patient has had a history of suicidal ideation: some, but no intent or plan and self-injurious behavior: in teen years, 2 years ago, cutting but not serious enough to go to the hospital  Patient denies current fears or concerns for personal safety.  Patient denies current or recent suicidal ideation or behaviors.  Patient denies current or recent homicidal ideation or behaviors.  Patient denies current or recent self injurious behavior or ideation.  Patient denies other safety concerns.  A safety and risk management plan has not been developed at this time, however patient was encouraged to call [place] [phone] should there be a change in any of these risk factors.    Appearance:   Appropriate   Eye Contact:   Good   Psychomotor Behavior: Normal   Attitude:   Cooperative   Orientation:   All  Speech   Rate / Production: Normal    Volume:      Mood:    Anxious  Depressed   Affect:    Appropriate   Thought Content:  Clear   Thought Form:  Coherent  Logical   Insight:    Good     Diagnoses:    296.33 (F33.2) Major Depressive Disorder, Recurrent Episode, Severe _ and With mixed features.  300.01 (F41.0) Panic Disorder  300.00 (F41.9) Unspecified Anxiety Disorder.    Plan/Recommendations: (Homework, other):  This author will follow up with the patient in approximately 30 days.    Patient continues to meet criteria for recommended level of care: 4B Adult Day Treatment  Patient would be at reasonable risk of requiring a higher level of care in the absence of current services.    Patient does agree with the current plan of care.    Meenu Garcia, D,  Licensed Clinical Psychologist    10/19/2021

## 2021-10-19 NOTE — GROUP NOTE
Process Group Note    PATIENT'S NAME: Francesca Griffin  MRN:   [id number]  :   2000  ACCT. NUMBER: [id number]  DATE OF SERVICE: 10/19/21  START TIME:  1:00 PM  END TIME:  1:50 PM  FACILITATOR: Adrianna Montoya LGSW  TOPIC:  Process Group    Diagnoses:   Principal DSM5 Diagnoses  (Sustained by DSM5 Criteria Listed Above):   296.33 (F33.2) Major Depressive Disorder, Recurrent Episode, Severe _ and With mixed features.  4. Other Diagnoses that is relevant to services:   300.01 (F41.0) Panic Disorder  300.00 (F41.9) Unspecified Anxiety Disorder.      [facility] Day Treatment  TRACK: 4B    NUMBER OF PARTICIPANTS:                                       Service Modality:  Video Visit     Telemedicine Visit: The patient's condition can be safely assessed and treated via synchronous audio and visual telemedicine encounter.      Reason for Telemedicine Visit: Services only offered telehealth    Originating Site (Patient Location): Patient's home    Distant Site (Provider Location): Provider Remote Setting- Home Office    Consent:  The patient/guardian has verbally consented to: the potential risks and benefits of telemedicine (video visit) versus in person care; bill my insurance or make self-payment for services provided; and responsibility for payment of non-covered services.     Patient would like the video invitation sent by:  My Chart    Mode of Communication:  Video Conference via Medical Zoom    As the provider I attest to compliance with applicable laws and regulations related to telemedicine.                Data:    Session content: At the start of this group, patients were invited to check in by identifying themselves, describing their current emotional status, and identifying issues to address in this group.   Area(s) of treatment focus addressed in this session included Symptom Management, Personal Safety and Community Resources/Discharge Planning.  Client reported feeling   surprisingly energetic  and specified that it did not feel like anxious energy.  Client reported increasing her anti-anxiety medication, which has caused sleepiness.  Client reports taking the medication throughout the day and that drinking two cups of coffee counteracts the feelings of sleepiness.  Client reported increased productivity and creativity.  Client reported successfully using assertive communication to alleviate anxiety surrounding a conflict they had with their roommate.  Client explained that they were able to avoid  backtracking  and found a way to not sound  harsh .  Client did not report any suicidal ideation, plan or intent.    Therapeutic Interventions/Treatment Strategies:  Psychotherapist offered support, feedback and validation and reinforced use of skills. Treatment modalities used include Cognitive Behavioral Therapy. Interventions include Symptoms Management: Promoted understanding of their diagnoses and how it impacts their functioning and Relationship Skills: Encouraged development and maintenance  of healthy boundaries.    Assessment:    Patient response:   Patient responded to session by accepting feedback, giving feedback and listening    Possible barriers to participation / learning include: and no barriers identified    Health Issues:   None reported       Substance Use Review:   Substance Use: No active concerns identified.    Mental Status/Behavioral Observations  Appearance:   Appropriate   Eye Contact:   Good   Psychomotor Behavior: Normal   Attitude:   Cooperative  Interested Friendly Pleasant  Orientation:   All  Speech   Rate / Production: Normal    Volume:  Normal   Mood:    Euthymic  Affect:    Appropriate   Thought Content:   Clear  Thought Form:  Coherent  Logical     Insight:    Good     Plan:     Safety Plan: No current safety concerns identified.  Recommended that patient call 911 or go to the local ED should there be a change in any of these risk factors.     Barriers  to treatment: None identified    Patient Contracts (see media tab):  None    Substance Use: Not addressed in session     Continue or Discharge: Patient will continue in Adult Day Treatment (ADT)  as planned. Patient is likely to benefit from learning and using skills as they work toward the goals identified in their treatment plan.      Adrianna Montoya, MANNY  October 19, 2021

## 2021-10-19 NOTE — GROUP NOTE
Psychotherapy Group Note    PATIENT'S NAME: Francesca Griffin  MRN:   [id number]  :   2000  ACCT. NUMBER: [id number]  DATE OF SERVICE: 10/19/21  START TIME:  2:00 PM  END TIME:  2:50 PM  FACILITATOR: Hattie Virk  TOPIC: MH EBP Group: Cognitive Restructuring  [facility] Adult Mental Health Day Treatment  TRACK: 4B                                      Service Modality:  Video Visit     Telemedicine Visit: The patient's condition can be safely assessed and treated via synchronous audio and visual telemedicine encounter.      Reason for Telemedicine Visit:  covid 19    Originating Site (Patient Location): Patient's home    Distant Site (Provider Location): Provider Remote Setting- Home Office    Consent:  The patient/guardian has verbally consented to: the potential risks and benefits of telemedicine (video visit) versus in person care; bill my insurance or make self-payment for services provided; and responsibility for payment of non-covered services.     Patient would like the video invitation sent by:  My Chart    Mode of Communication:  Video Conference via Medical Zoom    As the provider I attest to compliance with applicable laws and regulations related to telemedicine.          NUMBER OF PARTICIPANTS: 7    Summary of Group / Topics Discussed:  Cognitive Restructuring: Distortions: Patients received an overview of how to identify common cognitive distortions. Patients will explore alternatives to cognitive distortions and practice challenging their negative thought patterns. The goal is to help patients target modify ineffective thought patterns.     Patient Session Goals / Objectives:    Familiarized self with ineffective / unhealthy thoughts and how they develop.      Explored impact of ineffective thoughts / distortions on mood and activity    Formulated new neutral/positive alternatives to challenge less helpful / ineffective thoughts.    Practiced and plan to apply in daily  life               Patient Participation / Response:  Fully participated with the group by sharing personal reflections / insights and openly received / provided feedback with other participants.    Demonstrated understanding of topics discussed through group discussion and participation, Expressed understanding of the relationship between behaviors, thoughts, and feelings and Demonstrated knowledge of personal thought patterns and how they impact their mood and behavior.    Treatment Plan:  Patient has a current master individualized treatment plan.  See Epic treatment plan for more information.    Hattie Virk

## 2021-10-20 ASSESSMENT — ANXIETY QUESTIONNAIRES: GAD7 TOTAL SCORE: 18

## 2021-10-21 ENCOUNTER — HOSPITAL ENCOUNTER (OUTPATIENT)
Dept: BEHAVIORAL HEALTH | Facility: CLINIC | Age: 21
End: 2021-10-21
Attending: PSYCHIATRY & NEUROLOGY
Payer: COMMERCIAL

## 2021-10-21 PROCEDURE — 90853 GROUP PSYCHOTHERAPY: CPT | Mod: GT,95

## 2021-10-21 NOTE — GROUP NOTE
Process Group Note    PATIENT'S NAME: Francesca Griffin  MRN:   [id number]  :   2000  ACCT. NUMBER: [id number]  DATE OF SERVICE: 10/21/21  START TIME:  2:00 PM  END TIME:  2:50 PM  FACILITATOR: Adrianna Montoya LGSW; [facility]la Gar[facility]  TOPIC:  Process Group    Diagnoses:   Principal DSM5 Diagnoses  (Sustained by DSM5 Criteria Listed Above):   296.33 (F33.2) Major Depressive Disorder, Recurrent Episode, Severe _ and With mixed features.  4. Other Diagnoses that is relevant to services:   300.01 (F41.0) Panic Disorder  300.00 (F41.9) Unspecified Anxiety Disorder.      [facility] Treatment  TRACK: 4B    NUMBER OF PARTICIPANTS:                                       Service Modality:  Video Visit     Telemedicine Visit: The patient's condition can be safely assessed and treated via synchronous audio and visual telemedicine encounter.      Reason for Telemedicine Visit: Services only offered telehealth    Originating Site (Patient Location): Patient's home    Distant Site (Provider Location): Provider Remote Setting- Home Office    Consent:  The patient/guardian has verbally consented to: the potential risks and benefits of telemedicine (video visit) versus in person care; bill my insurance or make self-payment for services provided; and responsibility for payment of non-covered services.     Patient would like the video invitation sent by:  My Chart    Mode of Communication:  Video Conference via Medical Zoom    As the provider I attest to compliance with applicable laws and regulations related to telemedicine.                Data:    Session content: At the start of this group, patients were invited to check in by identifying themselves, describing their current emotional status, and identifying issues to address in this group.   Area(s) of treatment focus addressed in this session included Symptom Management, Personal Safety and Community Resources/Discharge Planning.  Client  reported increased feelings of panic as they were deciding what to check in about.  Client reported these feelings as similar to ones they had gotten when having a panic attack during group last week.  Client reported being overwhelmed with the steps involved in withdrawing from school and requesting a leave of absence.  Client also reported money and insurance being a stressor, although they mentioned planning to talk to their mother tonight to make some decisions.  Co-facilitator reminded client of their working knowledge of the steps needed to complete their withdrawal.  Client reported that their increased feelings of anxiety could be attributed to running out of their medication (Cymbalta) last night.  Writer encouraged client to pick those medications up and leave group early before pharmacy close if necessary.  Client did not report any suicidal ideation, plan or intent.    Therapeutic Interventions/Treatment Strategies:  Psychotherapist offered support, feedback and validation and reinforced use of skills. Treatment modalities used include Cognitive Behavioral Therapy. Interventions include Behavioral Activation: Encouraged strategies to reduce individual procrastination and increase motivation by increasing goal-directed activities to enhance mood and reduce symptoms. and Symptoms Management: Promoted understanding of their diagnoses and how it impacts their functioning.    Assessment:    Patient response:   Patient responded to session by accepting feedback, giving feedback and listening    Possible barriers to participation / learning include: and no barriers identified    Health Issues:   None reported       Substance Use Review:   Substance Use: No active concerns identified.    Mental Status/Behavioral Observations  Appearance:   Appropriate   Eye Contact:   Good   Psychomotor Behavior: Normal   Attitude:   Cooperative  Interested Friendly Pleasant  Orientation:   All  Speech   Rate /  Production: Normal    Volume:  Normal   Mood:    Anxious  Depressed  Panicked  Affect:    Appropriate   Thought Content:   Clear  Thought Form:  Coherent  Logical     Insight:    Good     Plan:     Safety Plan: No current safety concerns identified.  Recommended that patient call 911 or go to the local ED should there be a change in any of these risk factors.     Barriers to treatment: None identified    Patient Contracts (see media tab):  None    Substance Use: Not addressed in session     Continue or Discharge: Patient will continue in Adult Day Treatment (ADT)  as planned. Patient is likely to benefit from learning and using skills as they work toward the goals identified in their treatment plan.      Adrianna Montoya, MANNY  October 21, 2021

## 2021-10-21 NOTE — GROUP NOTE
Psychotherapy Group Note    PATIENT'S NAME: Francesca Griffin  MRN:   [id number]  :   2000  ACCT. NUMBER: [id number]  DATE OF SERVICE: 10/21/21  START TIME:  1:00 PM  END TIME:  1:50 PM  FACILITATOR: Adrianna Montoya LGSW  TOPIC: MH EBP Group: Coping Skills  [facility] Adult Mental Health Day Treatment  TRACK: 4B    NUMBER OF PARTICIPANTS: 6    Summary of Group / Topics Discussed:  Coping Skills: Additional Coping Skills:  Patients discussed and practiced humor as a coping skill.  Reviewed the benefits of applying the aforementioned coping strategies.  Patients explored how these strategies might be applied to daily stressors or distressing situations.    Patient Session Goals / Objectives:    Understand the purpose and benefits of applying humor and laughter coping strategies    Discuss the ways mindfulness and humor are connected.    Talk about humor as a coping skill vs. Humor as a defense mechanism    Address barriers to utilizing coping skills when in distress.                                      Service Modality:  Video Visit     Telemedicine Visit: The patient's condition can be safely assessed and treated via synchronous audio and visual telemedicine encounter.      Reason for Telemedicine Visit: Services only offered telehealth    Originating Site (Patient Location): Patient's home    Distant Site (Provider Location): Provider Remote Setting- Home Office    Consent:  The patient/guardian has verbally consented to: the potential risks and benefits of telemedicine (video visit) versus in person care; bill my insurance or make self-payment for services provided; and responsibility for payment of non-covered services.     Patient would like the video invitation sent by:  My Chart    Mode of Communication:  Video Conference via Medical Zoom    As the provider I attest to compliance with applicable laws and regulations related to telemedicine.            Patient Participation / Response:  Fully  participated with the group by sharing personal reflections / insights and openly received / provided feedback with other participants.    Demonstrated understanding of topics discussed through group discussion and participation, Expressed understanding of the relevance / importance of coping skills at distressing times in life and Demonstrated knowledge of when to consider using a variety of coping skills in daily life    Treatment Plan:  Patient has a current master individualized treatment plan.  See Epic treatment plan for more information.    Adrianna Montoya LGSW

## 2021-10-21 NOTE — GROUP NOTE
Psychotherapy Group Note    PATIENT'S NAME: Francesca Griffin  MRN:   [id number]  :   2000  ACCT. NUMBER: [id number]  DATE OF SERVICE: 10/21/21  START TIME:  3:00 PM  END TIME:  3:50 PM  FACILITATOR: Hattie Virk  TOPIC: MH EBP Group: Coping Skills  [facility] Adult Mental Health Day Treatment  TRACK: 4B                                      Service Modality:  Video Visit     Telemedicine Visit: The patient's condition can be safely assessed and treated via synchronous audio and visual telemedicine encounter.      Reason for Telemedicine Visit:  covid 19     Originating Site (Patient Location): Patient's home    Distant Site (Provider Location): Provider Remote Setting- Home Office    Consent:  The patient/guardian has verbally consented to: the potential risks and benefits of telemedicine (video visit) versus in person care; bill my insurance or make self-payment for services provided; and responsibility for payment of non-covered services.     Patient would like the video invitation sent by:  My Chart    Mode of Communication:  Video Conference via Medical Zoom    As the provider I attest to compliance with applicable laws and regulations related to telemedicine.          NUMBER OF PARTICIPANTS: 5    Summary of Group / Topics Discussed:  Coping Skills: Building Positive Experiences: Patients discussed the importance of planning and engaging in positive experiences, as strategies to increase positive thinking, hope, and self-worth.  Explored the benefits of planning / creating positive experiences, including recognizing and reducing negativity bias by focusing on and building positive experiences.   Several approaches to building positive experiences were presented and discussed relevant to each patient.      Patient Session Goals / Objectives:    Understand the purpose of planning / creating / participating / sharing in positive experiences.    Explore patient s experiences related to negative  thinking and how it influences activities and moodIdentify current positive events in patient s life.     Set goals to increase a variety of positive experiences.    Address barriers to planning / engaging in positive experiences.        Patient Participation / Response:  Fully participated with the group by sharing personal reflections / insights and openly received / provided feedback with other participants.    Demonstrated understanding of topics discussed through group discussion and participation, Expressed understanding of the relevance / importance of coping skills at distressing times in life and Demonstrated knowledge of when to consider using a variety of coping skills in daily life    Treatment Plan:  Patient has a current master individualized treatment plan.  See Epic treatment plan for more information.    Hattie Virk

## 2021-10-25 ENCOUNTER — HOSPITAL ENCOUNTER (OUTPATIENT)
Dept: BEHAVIORAL HEALTH | Facility: CLINIC | Age: 21
End: 2021-10-25
Attending: PSYCHIATRY & NEUROLOGY
Payer: COMMERCIAL

## 2021-10-25 PROCEDURE — 90853 GROUP PSYCHOTHERAPY: CPT | Mod: GT,95 | Performed by: COUNSELOR

## 2021-10-25 PROCEDURE — 90853 GROUP PSYCHOTHERAPY: CPT | Mod: GT,95 | Performed by: SOCIAL WORKER

## 2021-10-25 PROCEDURE — 90853 GROUP PSYCHOTHERAPY: CPT | Mod: GT,95

## 2021-10-25 NOTE — ADDENDUM NOTE
Encounter addended by: Frederic Virk MD on: 10/25/2021 8:35 AM   Actions taken: Clinical Note Signed

## 2021-10-25 NOTE — GROUP NOTE
Psychotherapy Group Note    PATIENT'S NAME: Francesca Griffin  MRN:   [id number]  :   2000  ACCT. NUMBER: [id number]  DATE OF SERVICE: 10/25/21  START TIME:  3:00 PM  END TIME:  3:50 PM  FACILITATOR: Hattie Virk  TOPIC: MH EBP Group: Self-Awareness  [facility] Adult Mental Health Day Treatment  TRACK: 4B                                      Service Modality:  Video Visit     Telemedicine Visit: The patient's condition can be safely assessed and treated via synchronous audio and visual telemedicine encounter.      Reason for Telemedicine Visit:  covid 19    Originating Site (Patient Location): Patient's home    Distant Site (Provider Location): Provider Remote Setting- Home Office    Consent:  The patient/guardian has verbally consented to: the potential risks and benefits of telemedicine (video visit) versus in person care; bill my insurance or make self-payment for services provided; and responsibility for payment of non-covered services.     Patient would like the video invitation sent by:  My Chart    Mode of Communication:  Video Conference via Medical Zoom    As the provider I attest to compliance with applicable laws and regulations related to telemedicine.          NUMBER OF PARTICIPANTS: 6    Summary of Group / Topics Discussed:  Self-Awareness: Gratitude: Topic focused on assisting patients in identifying key concepts in gratitude. Patients discussed the benefits of practicing gratitude and its impact on mood improvement, mindfulness, and perspective. Patients worked to increase time spent on recognition and appreciation of what is positive and working in their lives. Patients discussed the concepts and benefits of feeling grateful. The goal is to reduce rumination and negative thinking resulting in increased mindfulness and resilience. Patients specifically discussed how they can practice and problem solve barriers to daily gratitude practice.     Patient Session Goals /  Objectives:    [employer] the concept and benefits of gratitude    Identified ways to practice gratitude in daily life    Problem solved barriers to practicing gratitude      Patient Participation / Response:  Fully participated with the group by sharing personal reflections / insights and openly received / provided feedback with other participants.    Demonstrated understanding of topics discussed through group discussion and participation, Demonstrated understanding of values, strengths, and challenges to learn about themselves and Identified / Expressed readiness to act intentionally, increase self-compassion, promote personal growth    Treatment Plan:  Patient has a current master individualized treatment plan.  See Epic treatment plan for more information.    Hattie Virk

## 2021-10-25 NOTE — GROUP NOTE
Psychotherapy Group Note    PATIENT'S NAME: Francesca Griffin  MRN:   [id number]  :   2000  ACCT. NUMBER: [id number]  DATE OF SERVICE: 10/25/21  START TIME:  2:00 PM  END TIME:  2:50 PM  FACILITATOR: Madyson Cristina LPCC  TOPIC: MH EBP Group: Coping Skills  [facility] Adult Mental Health Day Treatment  TRACK: 4B    NUMBER OF PARTICIPANTS: 6    Summary of Group / Topics Discussed:  Coping Skills: Grounding: Patients discussed and practiced strategies to increase attachment / presence to the current moment.  Patients identified situations in which using these strategies will help improve emotion regulation sense of calm in the body.  Reviewed the benefits of applying grounding strategies, as well as past / current practices of each member.  Patients identified situations in which using these strategies would reduce stress. They developed the ability to distinguish when these strategies can be useful in their lives for management and stress and psychological well-being.    Patient Session Goals / Objectives:    Understand the purpose of using grounding strategies to reduce stress.    Verbalize understanding of how and when to apply grounding strategies to reduce distress and increase presence in the moment.    Review patients current grounding practices and discuss a more formal way of practicing and accessing skills.    Practice using various calming strategies (e.g. 5-4-3-2-1; mental and body awareness).    Choose 1-2 grounding strategies to apply during times of distress.                                      Service Modality:  Video Visit     Telemedicine Visit: The patient's condition can be safely assessed and treated via synchronous audio and visual telemedicine encounter.      Reason for Telemedicine Visit: Services only offered telehealth    Originating Site (Patient Location): Patient's home    Distant Site (Provider Location): Provider Remote Setting- Home Office    Consent:  The patient/guardian  has verbally consented to: the potential risks and benefits of telemedicine (video visit) versus in person care; bill my insurance or make self-payment for services provided; and responsibility for payment of non-covered services.     Patient would like the video invitation sent by:  My Chart    Mode of Communication:  Video Conference via Medical Zoom    As the provider I attest to compliance with applicable laws and regulations related to telemedicine.            Patient Participation / Response:  Fully participated with the group by sharing personal reflections / insights and openly received / provided feedback with other participants.    Demonstrated understanding of topics discussed through group discussion and participation, Expressed understanding of the relevance / importance of coping skills at distressing times in life and Demonstrated knowledge of when to consider using a variety of coping skills in daily life    Treatment Plan:  Patient has a current master individualized treatment plan.  See Epic treatment plan for more information.    Madyson Cristina, [facility]

## 2021-10-25 NOTE — GROUP NOTE
Process Group Note    PATIENT'S NAME: Francesca Griffin  MRN:   [id number]  :   2000  ACCT. NUMBER: [id number]  DATE OF SERVICE: 10/25/21  START TIME:  1:00 PM  END TIME:  1:50 PM  FACILITATOR: Perla Garvin [facility]  TOPIC:  Process Group    Diagnoses:  296.33 (F33.2) Major Depressive Disorder, Recurrent Episode, Severe _ and With mixed features.  300.01 (F41.0) Panic Disorder  300.00 (F41.9) Unspecified Anxiety Disorder.      [facility] Mental Health Day Treatment  TRACK: 4B    NUMBER OF PARTICIPANTS: 6                                      Service Modality:  Video Visit     Telemedicine Visit: The patient's condition can be safely assessed and treated via synchronous audio and visual telemedicine encounter.      Reason for Telemedicine Visit: Services only offered telehealth    Originating Site (Patient Location): Patient's home    Distant Site (Provider Location): Provider Remote Setting- Home Office    Consent:  The patient/guardian has verbally consented to: the potential risks and benefits of telemedicine (video visit) versus in person care; bill my insurance or make self-payment for services provided; and responsibility for payment of non-covered services.     Patient would like the video invitation sent by:  My Chart    Mode of Communication:  Video Conference via Medical Zoom    As the provider I attest to compliance with applicable laws and regulations related to telemedicine.               Data:    Session content: At the start of this group, patients were invited to check in by identifying themselves, describing their current emotional status, and identifying issues to address in this group.   Area(s) of treatment focus addressed in this session included Symptom Management, Personal Safety and Community Resources/Discharge Planning.  Francesca reported having a difficult conversation with their roommates.  They shared that they were having panic attacks about once per week.   They shared  that they talked to their parents about the plan to withdraw from this semesters classes and received support from them.  Client denied suicidal ideation, intent and plan. Client shared skills they paln to use to mange anxiety and panic.      Therapeutic Interventions/Treatment Strategies:  Psychotherapist offered support, feedback and validation and reinforced use of skills. Treatment modalities used include Cognitive Behavioral Therapy. Interventions include Coping Skills: Assisted patient in identifying 1-2 healthy distraction skills to reduce overall distress.    Assessment:    Patient response:   Patient responded to session by giving feedback, listening and focusing on goals    Possible barriers to participation / learning include: and no barriers identified    Health Issues:   None reported       Substance Use Review:   Substance Use: No active concerns identified.    Mental Status/Behavioral Observations  Appearance:   Appropriate   Eye Contact:   Good   Psychomotor Behavior: Normal   Attitude:   Cooperative   Orientation:   All  Speech   Rate / Production: Normal    Volume:  Normal   Mood:    Anxious  Depressed   Affect:    Appropriate   Thought Content:   Clear  Thought Form:  Coherent  Logical     Insight:    Good     Plan:     Safety Plan: No current safety concerns identified.  Recommended that patient call 911 or go to the local ED should there be a change in any of these risk factors.     Barriers to treatment: None identified    Patient Contracts (see media tab):  None    Substance Use: Not addressed in session     Continue or Discharge: Patient will continue in Adult Day Treatment (ADT)  as planned. Patient is likely to benefit from learning and using skills as they work toward the goals identified in their treatment plan.      Perla Garvin, [facility]  October 25, 2021

## 2021-10-25 NOTE — PROGRESS NOTES
Patient Active Problem List   Diagnosis     Major depressive disorder, recurrent episode, severe with anxious distress (H)       Current Outpatient Medications:      buPROPion (WELLBUTRIN) 100 MG tablet, Take 100 mg by mouth 2 times daily, Disp: , Rfl:      DULoxetine (CYMBALTA) 60 MG capsule, Take 60 mg by mouth 2 times daily 200mg twice a day 300mg once a day, Disp: , Rfl:      gabapentin (NEURONTIN) 300 MG/6ML oral solution, Take 300 mg by mouth 3 times daily , Disp: , Rfl:      hydrOXYzine (VISTARIL) 25 MG capsule, Take 25 mg by mouth At Bedtime, Disp: , Rfl:      ondansetron (ZOFRAN) 8 MG tablet, Take 8 mg by mouth every 8 hours as needed for nausea, Disp: , Rfl:      propranolol ER (INDERAL LA) 60 MG 24 hr capsule, Take by mouth daily, Disp: , Rfl:      rizatriptan (MAXALT-MLT) 5 MG ODT, Take 5 mg by mouth at onset of headache for migraine, Disp: , Rfl:   Psychiatry staffing: case discussed  Diagnosis:  As above;  Plus panic and anxiety.  Anxiety affects schoolwork.

## 2021-10-26 ENCOUNTER — HOSPITAL ENCOUNTER (OUTPATIENT)
Dept: BEHAVIORAL HEALTH | Facility: CLINIC | Age: 21
End: 2021-10-26
Attending: PSYCHIATRY & NEUROLOGY
Payer: COMMERCIAL

## 2021-10-26 PROCEDURE — 90853 GROUP PSYCHOTHERAPY: CPT | Mod: 95

## 2021-10-26 PROCEDURE — 90853 GROUP PSYCHOTHERAPY: CPT | Mod: GT,95

## 2021-10-26 NOTE — GROUP NOTE
Psychotherapy Group Note    PATIENT'S NAME: Francesca Griffin  MRN:   [id number]  :   2000  ACCT. NUMBER: [id number]  DATE OF SERVICE: 10/26/21  START TIME:  3:00 PM  END TIME:  3:50 PM  FACILITATOR: Hattie Virk  TOPIC: MH EBP Group: Coping Skills  [facility] Adult Mental Health Day Treatment  TRACK: 4B                                      Service Modality:  Video Visit     Telemedicine Visit: The patient's condition can be safely assessed and treated via synchronous audio and visual telemedicine encounter.      Reason for Telemedicine Visit:  covid 19     Originating Site (Patient Location): Patient's home    Distant Site (Provider Location): Provider Remote Setting- Home Office    Consent:  The patient/guardian has verbally consented to: the potential risks and benefits of telemedicine (video visit) versus in person care; bill my insurance or make self-payment for services provided; and responsibility for payment of non-covered services.     Patient would like the video invitation sent by:  My Chart    Mode of Communication:  Video Conference via Medical Zoom    As the provider I attest to compliance with applicable laws and regulations related to telemedicine.          NUMBER OF PARTICIPANTS: 6    Summary of Group / Topics Discussed:  Coping Skills: Improve the Moment: Patients learned to tolerate distress, by applying strategies to effect positive change in the present moment.  Patients will identified situations where they would benefit from applying strategies to improve the moment and reduce distress. Patients discussed how to distinguish when this can be useful in their lives or when other strategies would be more relevant or helpful.    Patient Session Goals / Objectives:    Discuss how the use of intentional  in the moment  actions can help reduce distress.    Review patients current practices and discuss a more formal way of practicing and accessing skills.    Increase ability to decide  when to use improve the moment strategies    Choose 1-2 in the moment actions to apply during times of distress.        Patient Participation / Response:  Fully participated with the group by sharing personal reflections / insights and openly received / provided feedback with other participants.    Demonstrated understanding of topics discussed through group discussion and participation, Expressed understanding of the relevance / importance of coping skills at distressing times in life and Demonstrated knowledge of when to consider using a variety of coping skills in daily life    Treatment Plan:  Patient has a current master individualized treatment plan.  See Epic treatment plan for more information.    Hattie Virk

## 2021-10-26 NOTE — GROUP NOTE
Psychotherapy Group Note    PATIENT'S NAME: Francesca Griffin  MRN:   [id number]  :   2000  ACCT. NUMBER: [id number]  DATE OF SERVICE: 10/26/21  START TIME:  2:00 PM  END TIME:  2:50 PM  FACILITATOR: Hattie Virk  TOPIC: MH EBP Group: Coping Skills  [facility] Adult Mental Health Day Treatment  TRACK: 4B                                      Service Modality:  Video Visit     Telemedicine Visit: The patient's condition can be safely assessed and treated via synchronous audio and visual telemedicine encounter.      Reason for Telemedicine Visit: Services only offered telehealth    Originating Site (Patient Location): Patient's home    Distant Site (Provider Location): Provider Remote Setting- Home Office    Consent:  The patient/guardian has verbally consented to: the potential risks and benefits of telemedicine (video visit) versus in person care; bill my insurance or make self-payment for services provided; and responsibility for payment of non-covered services.     Patient would like the video invitation sent by:  My Chart    Mode of Communication:  Video Conference via Medical Zoom    As the provider I attest to compliance with applicable laws and regulations related to telemedicine.          NUMBER OF PARTICIPANTS: 6    Summary of Group / Topics Discussed:  Coping Skills: Improve the Moment: Patients learned to tolerate distress, by applying strategies to effect positive change in the present moment.  Patients will identified situations where they would benefit from applying strategies to improve the moment and reduce distress. Patients discussed how to distinguish when this can be useful in their lives or when other strategies would be more relevant or helpful.    Patient Session Goals / Objectives:    Discuss how the use of intentional  in the moment  actions can help reduce distress.    Review patients current practices and discuss a more formal way of practicing and accessing  skills.    Increase ability to decide when to use improve the moment strategies    Choose 1-2 in the moment actions to apply during times of distress.        Patient Participation / Response:  Fully participated with the group by sharing personal reflections / insights and openly received / provided feedback with other participants.    Demonstrated understanding of topics discussed through group discussion and participation, Expressed understanding of the relevance / importance of coping skills at distressing times in life and Demonstrated knowledge of when to consider using a variety of coping skills in daily life    Treatment Plan:  Patient has a current master individualized treatment plan.  See Epic treatment plan for more information.    Hattie Virk

## 2021-10-26 NOTE — GROUP NOTE
Process Group Note    PATIENT'S NAME: Francesca Griffin  MRN:   [id number]  :   2000  ACCT. NUMBER: [id number]  DATE OF SERVICE: 10/26/21  START TIME:  1:00 PM  END TIME:  1:50 PM  FACILITATOR: Adrianna Montoya LGSW; [facility]vin [facility]  TOPIC:  Process Group    Diagnoses:   Principal DSM5 Diagnoses  (Sustained by DSM5 Criteria Listed Above):   296.33 (F33.2) Major Depressive Disorder, Recurrent Episode, Severe _ and With mixed features.  4. Other Diagnoses that is relevant to services:   300.01 (F41.0) Panic Disorder  300.00 (F41.9) Unspecified Anxiety Disorder.      [facility] Day Treatment  TRACK: 4B    NUMBER OF PARTICIPANTS: 6                                      Service Modality:  Video Visit     Telemedicine Visit: The patient's condition can be safely assessed and treated via synchronous audio and visual telemedicine encounter.      Reason for Telemedicine Visit: Services only offered telehealth    Originating Site (Patient Location): Patient's home    Distant Site (Provider Location): Provider Remote Setting- Home Office    Consent:  The patient/guardian has verbally consented to: the potential risks and benefits of telemedicine (video visit) versus in person care; bill my insurance or make self-payment for services provided; and responsibility for payment of non-covered services.     Patient would like the video invitation sent by:  My Chart    Mode of Communication:  Video Conference via Medical Zoom    As the provider I attest to compliance with applicable laws and regulations related to telemedicine.                Data:    Session content: At the start of this group, patients were invited to check in by identifying themselves, describing their current emotional status, and identifying issues to address in this group.   Area(s) of treatment focus addressed in this session included Symptom Management, Personal Safety and Community Resources/Discharge Planning.  Client  reported feeling  scatterbrained  today and expressed anger at self over accidently missing an appointment with their  for the  second time  regarding their withdrawal from their schooling.  Client reported sending a follow up email to their  requesting to reschedule.  Writer reminded client to not blame self for missing the appointment as it was not intentional.  Writer commended client on ability to follow up via email despite feeling distress over missing the appointment.  Client explained that their motivation for sending the email came from not wanting the problem to  get worse .   Client expressed being overwhelmed by the research and  tangible steps  required to withdraw from school.  Client reported an increase in depressive symptoms, stating that their tendency to isolate has been avoided by friends and family making connection with them.    Client also reported feeling relieved about their roommates resolving an argument.  Client did not report any suicidal ideation, plan or intent.      Therapeutic Interventions/Treatment Strategies:  Psychotherapist offered support, feedback and validation and reinforced use of skills. Treatment modalities used include Cognitive Behavioral Therapy. Interventions include Behavioral Activation: Encouraged strategies to reduce individual procrastination and increase motivation by increasing goal-directed activities to enhance mood and reduce symptoms..    Assessment:    Patient response:   Patient responded to session by accepting feedback, giving feedback and listening    Possible barriers to participation / learning include: and no barriers identified    Health Issues:   None reported       Substance Use Review:   Substance Use: No active concerns identified.    Mental Status/Behavioral Observations  Appearance:   Appropriate   Eye Contact:   Good   Psychomotor Behavior: Normal   Attitude:   Cooperative  Interested Friendly  Pleasant  Orientation:   All  Speech   Rate / Production: Normal    Volume:  Normal   Mood:    Anxious  Depressed   Affect:    Appropriate   Thought Content:   Clear and Safety denies any current safety concerns including suicidal ideation, self-harm, and homicidal ideation  Thought Form:  Coherent  Logical     Insight:    Good     Plan:     Safety Plan: No current safety concerns identified.  Recommended that patient call 911 or go to the local ED should there be a change in any of these risk factors.     Barriers to treatment: None identified    Patient Contracts (see media tab):  None    Substance Use: Not addressed in session     Continue or Discharge: Patient will continue in Adult Day Treatment (ADT)  as planned. Patient is likely to benefit from learning and using skills as they work toward the goals identified in their treatment plan.      MANNY Farfan  October 26, 2021

## 2021-10-28 ENCOUNTER — HOSPITAL ENCOUNTER (OUTPATIENT)
Dept: BEHAVIORAL HEALTH | Facility: CLINIC | Age: 21
End: 2021-10-28
Attending: PSYCHIATRY & NEUROLOGY
Payer: COMMERCIAL

## 2021-10-28 PROCEDURE — 90853 GROUP PSYCHOTHERAPY: CPT | Mod: GT,95

## 2021-10-28 PROCEDURE — 90853 GROUP PSYCHOTHERAPY: CPT | Mod: 95

## 2021-10-28 NOTE — GROUP NOTE
Psychotherapy Group Note    PATIENT'S NAME: Francesac Griffin  MRN:   [id number]  :   2000  ACCT. NUMBER: [id number]  DATE OF SERVICE: 10/28/21  START TIME:  1:00 PM  END TIME:  1:50 PM  FACILITATOR: Adrianna Montoya LGSW  TOPIC: MH EBP Group: Behavioral Activation  [facility] Adult Mental Health Day Treatment  TRACK: 4B    NUMBER OF PARTICIPANTS: 7    Summary of Group / Topics Discussed:  Behavioral Activation: Motivation and Procrastination: Patients explored how they currently spend their time, identifying thoughts and feelings that are motivating and serve to increase desired behaviors.  They also examined behaviors that contribute to procrastination.  Different types of procrastination behaviors were identified, and strategies to reduce individual procrastination and increase motivation were explored and practiced.  Patients identified ways to increase goal-directed activities to enhance mood and reduce symptoms.        Patient Session Goals / Objectives:    Discuss barriers to decision making and strategies to make decisions that resist crisis urges    Used Distress Tolerance Handout 5 to talk about pros and cons as a strategy to make decisions about behavior.    Identify current patterns of procrastination behavior and how they influence thoughts and moods, and inhibit motivation.    Identify behaviors that can be implemented that contribute to improving thoughts and feelings, motivation, and reduce symptoms.    Identify and develop a plan to increase activities that promote a sense of accomplishment and competence.    Practice scheduling positive activities / behaviors into daily routines.      Patient Participation / Response:  Fully participated with the group by sharing personal reflections / insights and openly received / provided feedback with other participants.    Demonstrated understanding of topics discussed through group discussion and participation, Expressed understanding of the  relationship between behaviors, thoughts, and feelings and Shared experiences and challenges with making behavioral changes    Treatment Plan:  Patient has a current master individualized treatment plan.  See Epic treatment plan for more information.    Adrianna Montoya LGSW

## 2021-10-28 NOTE — GROUP NOTE
Process Group Note    PATIENT'S NAME: Francesca Griffin  MRN:   [id number]  :   2000  ACCT. NUMBER: [id number]  DATE OF SERVICE: 10/28/21  START TIME:  2:00 PM  END TIME:  2:50 PM  FACILITATOR: Adrianna Montoya LGSW; [facility]vin [facility]  TOPIC:  Process Group    Diagnoses:   Principal DSM5 Diagnoses  (Sustained by DSM5 Criteria Listed Above):   296.33 (F33.2) Major Depressive Disorder, Recurrent Episode, Severe _ and With mixed features.  4. Other Diagnoses that is relevant to services:   300.01 (F41.0) Panic Disorder  300.00 (F41.9) Unspecified Anxiety Disorder.      [facility] Day Treatment  TRACK: 4B    NUMBER OF PARTICIPANTS: 7                                      Service Modality:  Video Visit     Telemedicine Visit: The patient's condition can be safely assessed and treated via synchronous audio and visual telemedicine encounter.      Reason for Telemedicine Visit: Services only offered telehealth    Originating Site (Patient Location): Patient's home    Distant Site (Provider Location): Provider Remote Setting- Home Office    Consent:  The patient/guardian has verbally consented to: the potential risks and benefits of telemedicine (video visit) versus in person care; bill my insurance or make self-payment for services provided; and responsibility for payment of non-covered services.     Patient would like the video invitation sent by:  My Chart    Mode of Communication:  Video Conference via Medical Zoom    As the provider I attest to compliance with applicable laws and regulations related to telemedicine.                Data:    Session content: At the start of this group, patients were invited to check in by identifying themselves, describing their current emotional status, and identifying issues to address in this group.   Area(s) of treatment focus addressed in this session included Symptom Management, Personal Safety and Community Resources/Discharge Planning.  Client  reported increased anxiety and panic, starting this morning.   I would prefer to crawl out of my own skin .  Client reported hesitancy attending a meeting with their  after group today due to their increased feelings of panic.  Client explained that this meeting is to finish completing an incomplete class from last semester.   Client reported putting on a  cozy hat and gloves  to feel better and co-facilitator commended client on taking steps to self-soothe.  Client reported feeling the desire to lay in their bed and watch  something dumb .  Writer and co-facilitator encouraged client to find ways to meet both needs of self-soothing and attend the meeting and offered suggestions such as requesting the meeting to be virtual or to use watching videos as a reward for themselves to attend the meeting.   Client reported forgetting to take their meds until this afternoon and cited this as a potential reason for increased panic.  Client did not report any suicidal ideation, plan or intent.      Therapeutic Interventions/Treatment Strategies:  Psychotherapist offered support, feedback and validation and reinforced use of skills. Treatment modalities used include Cognitive Behavioral Therapy. Interventions include Behavioral Activation: Explored how behaviors effect mood and interact with thoughts and feelings and Coping Skills: Discussed use of self-soothe skills to decrease distress in the body.    Assessment:    Patient response:   Patient responded to session by accepting feedback, giving feedback and listening    Possible barriers to participation / learning include: and no barriers identified    Health Issues:   None reported       Substance Use Review:   Substance Use: No active concerns identified.    Mental Status/Behavioral Observations  Appearance:   Appropriate   Eye Contact:   Good   Psychomotor Behavior: Normal   Attitude:   Cooperative  Interested Pleasant  Orientation:   All  Speech   Rate /  Production: Normal    Volume:  Normal   Mood:    Anxious  Depressed  Panicked  Affect:    Appropriate   Thought Content:   Clear and Safety denies any current safety concerns including suicidal ideation, self-harm, and homicidal ideation  Thought Form:  Coherent  Logical     Insight:    Good     Plan:   Safety Plan: No current safety concerns identified.  Recommended that patient call 911 or go to the local ED should there be a change in any of these risk factors.   Barriers to treatment: None identified  Patient Contracts (see media tab):  None  Substance Use: Not addressed in session   Continue or Discharge: Patient will continue in Adult Day Treatment (ADT)  as planned. Patient is likely to benefit from learning and using skills as they work toward the goals identified in their treatment plan.      MANNY Farfan  October 28, 2021

## 2021-10-28 NOTE — GROUP NOTE
Psychoeducation Group Note    PATIENT'S NAME: Francesca Griffin  MRN:   [id number]  :   2000  ACCT. NUMBER: [id number]  DATE OF SERVICE: 10/28/21  START TIME:  3:00 PM  END TIME:  3:50 PM  FACILITATOR: Hattie Virk  TOPIC: MANUEL RN Group: Health Maintenance  [facility] Day Treatment  TRACK:4B                                      Service Modality:  Video Visit     Telemedicine Visit: The patient's condition can be safely assessed and treated via synchronous audio and visual telemedicine encounter.      Reason for Telemedicine Visit: Services only offered telehealth    Originating Site (Patient Location): Patient's home    Distant Site (Provider Location): Provider Remote Setting- Home Office    Consent:  The patient/guardian has verbally consented to: the potential risks and benefits of telemedicine (video visit) versus in person care; bill my insurance or make self-payment for services provided; and responsibility for payment of non-covered services.     Patient would like the video invitation sent by:  My Chart    Mode of Communication:  Video Conference via Medical Zoom    As the provider I attest to compliance with applicable laws and regulations related to telemedicine.          NUMBER OF PARTICIPANTS: 7    Summary of Group / Topics Discussed:  Health Maintenance: Weekend planning: Patients were given time to complete a weekend plan of what they will do to promote wellness and sobriety over the weekend when they do not have the structure of group. Patients were encouraged to review progress on their treatment goals and were challenged to identify ways to work toward meeting them. Patients identified and discussed foreseeable barriers to success over the weekend and then developed a plan to overcome them. Patients reviewed their distress coping skills and social support network and discussed this with the group.       Patient Session Goals / Objectives:    ?    Identified  activities to engage in that promote balance in wellness  ?    Distinguished possible barriers to success over the weekend and created a plan to overcome them  ?    Listed distress coping skills and identified social support network to utilize if in crisis during the weekend          Patient Participation / Response:  Fully participated with the group by sharing personal reflections / insights and openly received / provided feedback with other participants.    Demonstrated understanding of topics discussed through group discussion and participation, Identified / Expressed personal readiness to practice skills and Verbalized understanding of health maintenance topic    Treatment Plan:  Patient has a current master individualized treatment plan.  See Epic treatment plan for more information.    Hattie Virk

## 2021-11-01 ENCOUNTER — HOSPITAL ENCOUNTER (OUTPATIENT)
Dept: BEHAVIORAL HEALTH | Facility: CLINIC | Age: 21
End: 2021-11-01
Attending: PSYCHIATRY & NEUROLOGY
Payer: COMMERCIAL

## 2021-11-01 PROCEDURE — 90853 GROUP PSYCHOTHERAPY: CPT | Mod: GT,95 | Performed by: SOCIAL WORKER

## 2021-11-01 PROCEDURE — 90853 GROUP PSYCHOTHERAPY: CPT | Mod: GT,95

## 2021-11-01 NOTE — GROUP NOTE
Psychotherapy Group Note    PATIENT'S NAME: Francesca Griffin  MRN:   [id number]  :   2000  ACCT. NUMBER: [id number]  DATE OF SERVICE: 21  START TIME:  2:00 PM  END TIME:  2:50 PM  FACILITATOR: Perla Garvin [facility]  TOPIC: MH EBP Group: Coping Skills  [facility] Adult Mental Health Day Treatment  TRACK: 4B    NUMBER OF PARTICIPANTS: 5    Summary of Group / Topics Discussed:  Coping Skills: Additional Coping Skills:  Patients discussed and practiced time management.  Reviewed the benefits of applying the aforementioned coping strategies.  Patients explored how these strategies might be applied to daily stressors or distressing situations.    Patient Session Goals / Objectives:    Understand the purpose and benefits of applying time management coping strategies    Address barriers to utilizing coping skills when in distress.                                    Service Modality:  Video Visit     Telemedicine Visit: The patient's condition can be safely assessed and treated via synchronous audio and visual telemedicine encounter.      Reason for Telemedicine Visit: Services only offered telehealth    Originating Site (Patient Location): Patient's home    Distant Site (Provider Location): Provider Remote Setting- Home Office    Consent:  The patient/guardian has verbally consented to: the potential risks and benefits of telemedicine (video visit) versus in person care; bill my insurance or make self-payment for services provided; and responsibility for payment of non-covered services.     Patient would like the video invitation sent by:  My Chart    Mode of Communication:  Video Conference via Medical Zoom    As the provider I attest to compliance with applicable laws and regulations related to telemedicine.           Patient Participation / Response:  Fully participated with the group by sharing personal reflections / insights and openly received / provided feedback with other  participants.    Demonstrated knowledge of when to consider using a variety of coping skills in daily life, Practiced 2-3 new coping skills in session and Identified / Expressed personal readiness to practice new coping skills    Treatment Plan:  Patient has a current master individualized treatment plan.  See Epic treatment plan for more information.    Perla Garvin, [facility]

## 2021-11-01 NOTE — GROUP NOTE
"Process Group Note    PATIENT'S NAME: Francesca Griffin  MRN:   [id number]  :   2000  ACCT. NUMBER: [id number]  DATE OF SERVICE: 21  START TIME:  1:00 PM  END TIME:  1:50 PM  FACILITATOR: Perla Garvin [facility]  TOPIC:  Process Group    Diagnoses:  296.33 (F33.2) Major Depressive Disorder, Recurrent Episode, Severe _ and With mixed features.  300.01 (F41.0) Panic Disorder  300.00 (F41.9) Unspecified Anxiety Disorder.      [facility] Mental Health Day Treatment  TRACK: 4B    NUMBER OF PARTICIPANTS: 7                                      Service Modality:  Video Visit     Telemedicine Visit: The patient's condition can be safely assessed and treated via synchronous audio and visual telemedicine encounter.      Reason for Telemedicine Visit: Services only offered telehealth    Originating Site (Patient Location): Patient's home    Distant Site (Provider Location): Provider Remote Setting- Home Office    Consent:  The patient/guardian has verbally consented to: the potential risks and benefits of telemedicine (video visit) versus in person care; bill my insurance or make self-payment for services provided; and responsibility for payment of non-covered services.     Patient would like the video invitation sent by:  My Chart    Mode of Communication:  Video Conference via Medical Zoom    As the provider I attest to compliance with applicable laws and regulations related to telemedicine.               Data:    Session content: At the start of this group, patients were invited to check in by identifying themselves, describing their current emotional status, and identifying issues to address in this group.   Area(s) of treatment focus addressed in this session included Symptom Management, Personal Safety and Community Resources/Discharge Planning.  Francesca reported feeling tired today.  She shared that she socialized more than usual by attending three [employer] parties.   She stated that she \"drank too " "much, more than usual\" and did not feel well.   She developed a plan to take a short nap, eat food, and drink water as self-care.  She stated that she had poor sleep as she slept over at the party on Sunday night.  Client denied suicidal ideation, intent and plan.     Therapeutic Interventions/Treatment Strategies:  Psychotherapist offered support, feedback and validation and reinforced use of skills. Treatment modalities used include Cognitive Behavioral Therapy. Interventions include Coping Skills: Discussed skills to manage fatigue..    Assessment:    Patient response:   Patient responded to session by giving feedback, listening and focusing on goals    Possible barriers to participation / learning include: distracted by fatigue    Health Issues:   None reported       Substance Use Review:   Substance Use: No active concerns identified. Client reported more alcohol use than usual when attending Halloween parties.    Mental Status/Behavioral Observations  Appearance:   Appropriate   Eye Contact:   Fair   Psychomotor Behavior: Normal   Attitude:   Cooperative   Orientation:   All  Speech   Rate / Production: Normal    Volume:  Normal   Mood:    Depressed   Affect:    Appropriate   Thought Content:   Clear  Thought Form:  Coherent  Logical     Insight:    Good     Plan:     Safety Plan: No current safety concerns identified.  Recommended that patient call 911 or go to the local ED should there be a change in any of these risk factors.     Barriers to treatment: None identified    Patient Contracts (see media tab):  None    Substance Use: Not addressed in session     Continue or Discharge: Patient will continue in Adult Day Treatment (ADT)  as planned. Patient is likely to benefit from learning and using skills as they work toward the goals identified in their treatment plan.      Perla Garvin, [facility]  November 1, 2021    "

## 2021-11-01 NOTE — GROUP NOTE
Psychotherapy Group Note    PATIENT'S NAME: Francesca Griffin  MRN:   [id number]  :   2000  ACCT. NUMBER: [id number]  DATE OF SERVICE: 21  START TIME:  3:00 PM  END TIME:  3:50 PM  FACILITATOR: Hattie Virk  TOPIC: MH EBP Group: Emotions Management  [facility] Mental Health Day Treatment  TRACK: 4B                                      Service Modality:  Video Visit     Telemedicine Visit: The patient's condition can be safely assessed and treated via synchronous audio and visual telemedicine encounter.      Reason for Telemedicine Visit: Services only offered telehealth    Originating Site (Patient Location): Patient's home    Distant Site (Provider Location): Provider Remote Setting- Home Office    Consent:  The patient/guardian has verbally consented to: the potential risks and benefits of telemedicine (video visit) versus in person care; bill my insurance or make self-payment for services provided; and responsibility for payment of non-covered services.     Patient would like the video invitation sent by:  My Chart    Mode of Communication:  Video Conference via Medical Zoom    As the provider I attest to compliance with applicable laws and regulations related to telemedicine.          NUMBER OF PARTICIPANTS: 5    Summary of Group / Topics Discussed:  Emotions Management: Opposite to Emotion: Patients discussed past and present struggles with knowing how to make changes in their lives due to difficult emotional experiences.  Explored desires to experience and feel less anger, sadness, guilt, and fear.  Reviewed the therapeutic skill of opposite action and patients explored opportunities to use their behaviors as a tool to reduce an emotion that they want to change.     Patient Session Goals / Objectives:    Review DBT concepts and focus on patient s experiences of distress and difficult emotional experiences.    Learn how to do the opposite of what an emotion makes us want to do in an  effort to decrease an unwanted emotional experience.    Demonstrate understanding of the skill of opposite action by sharing experiences where the technique could be useful in past / present situations.      Patient Participation / Response:  Fully participated with the group by sharing personal reflections / insights and openly received / provided feedback with other participants.    Demonstrated understanding of topics discussed through group discussion and participation, Expressed understanding of the relevance / importance of emotions management skills at distressing times in life and Self-aware of experiences with difficult emotions, and strategies to employ to manage them    Treatment Plan:  Patient has a current master individualized treatment plan.  See Epic treatment plan for more information.    Hattie Virk

## 2021-11-02 ENCOUNTER — HOSPITAL ENCOUNTER (OUTPATIENT)
Dept: BEHAVIORAL HEALTH | Facility: CLINIC | Age: 21
End: 2021-11-02
Attending: PSYCHIATRY & NEUROLOGY
Payer: COMMERCIAL

## 2021-11-02 PROCEDURE — 90853 GROUP PSYCHOTHERAPY: CPT | Mod: GT,95

## 2021-11-02 PROCEDURE — 90853 GROUP PSYCHOTHERAPY: CPT | Mod: GT,95 | Performed by: SOCIAL WORKER

## 2021-11-02 NOTE — GROUP NOTE
Process Group Note    PATIENT'S NAME: Francesca Griffin  MRN:   [id number]  :   2000    ACCT. NUMBER: [id number]  DATE OF SERVICE: 21  START TIME:  1:00 PM  END TIME:  1:50 PM  FACILITATOR: Perla Garvin [facility]  TOPIC:  Process Group    Diagnoses:  296.33 (F33.2) Major Depressive Disorder, Recurrent Episode, Severe _ and With mixed features.  300.01 (F41.0) Panic Disorder  300.00 (F41.9) Unspecified Anxiety Disorder.    [facility] Mental Health Day Treatment  TRACK: 4B    NUMBER OF PARTICIPANTS: 7                                      Service Modality:  Video Visit     Telemedicine Visit: The patient's condition can be safely assessed and treated via synchronous audio and visual telemedicine encounter.      Reason for Telemedicine Visit: Services only offered telehealth    Originating Site (Patient Location): Patient's home    Distant Site (Provider Location): Provider Remote Setting- Home Office    Consent:  The patient/guardian has verbally consented to: the potential risks and benefits of telemedicine (video visit) versus in person care; bill my insurance or make self-payment for services provided; and responsibility for payment of non-covered services.     Patient would like the video invitation sent by:  My Chart    Mode of Communication:  Video Conference via Medical Zoom    As the provider I attest to compliance with applicable laws and regulations related to telemedicine.               Data:    Session content: At the start of this group, patients were invited to check in by identifying themselves, describing their current emotional status, and identifying issues to address in this group.   Area(s) of treatment focus addressed in this session included Symptom Management, Personal Safety and Community Resources/Discharge Planning.  Francesca reported considering options to not complete the class that is in incomplete status.  They reported that they have missed a firm deadline and it  "might not be possible to get an extension.   They shared that it might be helpful to not have the pressure of the incomplete class given how high their symptoms are at this time.   They reported ongoing high anxiety with about one panic attack per week.  Client denied suicidal ideation, intent and plan.     Therapeutic Interventions/Treatment Strategies:  Psychotherapist offered support, feedback and validation and reinforced use of skills. Treatment modalities used include Cognitive Behavioral Therapy. Interventions include Cognitive Restructuring:  Assisted patient in formulating new neutral/positive alternatives to challenge less helpful / ineffective thoughts and Facilitated recognition of the connection between negative thoughts and negative core beliefs and Coping Skills: Assisted patient in identifying 1-2 healthy distraction skills to reduce overall distress and Discussed how the use of intentional \"in the moment\" actions can help reduce distress.    Assessment:    Patient response:   Patient responded to session by listening, focusing on goals and being attentive    Possible barriers to participation / learning include: and no barriers identified    Health Issues:   None reported       Substance Use Review:   Substance Use: No active concerns identified.    Mental Status/Behavioral Observations  Appearance:   Appropriate   Eye Contact:   Good   Psychomotor Behavior: Normal   Attitude:   Cooperative   Orientation:   All  Speech   Rate / Production: Normal    Volume:  Normal   Mood:    Anxious  Depressed   Affect:    Appropriate   Thought Content:   Clear  Thought Form:  Coherent  Logical     Insight:    Good     Plan:     Safety Plan: No current safety concerns identified.  Recommended that patient call 911 or go to the local ED should there be a change in any of these risk factors.     Barriers to treatment: None identified    Patient Contracts (see media tab):  None    Substance Use: Not addressed in " session     Continue or Discharge: Patient will continue in Adult Day Treatment (ADT)  as planned. Patient is likely to benefit from learning and using skills as they work toward the goals identified in their treatment plan.      Perla Garvin, [facility]  November 2, 2021

## 2021-11-02 NOTE — GROUP NOTE
Psychotherapy Group Note    PATIENT'S NAME: Francesca Griffin  MRN:   [id number]  :   2000  ACCT. NUMBER: [id number]  DATE OF SERVICE: 21  START TIME:  2:00 PM  END TIME:  2:50 PM  FACILITATOR: Hattie Virk  TOPIC: MH EBP Group: Emotions Management  [facility] Mental Health Day Treatment  TRACK: 4B                                      Service Modality:  Video Visit     Telemedicine Visit: The patient's condition can be safely assessed and treated via synchronous audio and visual telemedicine encounter.      Reason for Telemedicine Visit:  covid 19     Originating Site (Patient Location): Patient's home    Distant Site (Provider Location): Provider Remote Setting- Home Office    Consent:  The patient/guardian has verbally consented to: the potential risks and benefits of telemedicine (video visit) versus in person care; bill my insurance or make self-payment for services provided; and responsibility for payment of non-covered services.     Patient would like the video invitation sent by:  My Chart    Mode of Communication:  Video Conference via Medical Zoom    As the provider I attest to compliance with applicable laws and regulations related to telemedicine.          NUMBER OF PARTICIPANTS: 7    Summary of Group / Topics Discussed:  Emotions Management: Check Facts: Patients participated in an interactive approach to identifying and challenging cognitive distortions that arise following an event that triggers intense emotion.  Patients choose an emotional reaction/event to work on.  The group shared their experiences and thought processes for feedback.      Patient Session Goals / Objectives:    Learn the process of identifying thoughts associated with the situation and reaction    Learn to challenge cognitive distortions and reframe the situation/event/reaction     Distinguish between facts, feelings, thoughts    Gain understanding of how to interpret an emotional reaction    Practice  identification of cognitive distortions and evaluating an emotionally charged situation more rationally/objectively/mindfully      Patient Participation / Response:  Moderately participated, sharing some personal reflections / insights and adequately adequately received / provided feedback with other participants.    Demonstrated understanding of topics discussed through group discussion and participation    Treatment Plan:  Patient has a current master individualized treatment plan.  See Epic treatment plan for more information.    Hattie Virk

## 2021-11-02 NOTE — GROUP NOTE
Psychotherapy Group Note    PATIENT'S NAME: Francesca Griffin  MRN:   [id number]  :   2000  ACCT. NUMBER: [id number]  DATE OF SERVICE: 21  START TIME:  3:00 PM  END TIME:  3:50 PM  FACILITATOR: Hattie Virk  TOPIC:  EBP Group: [facility] Adult Mental Health Day Treatment  TRACK: 4B                                      Service Modality:  Video Visit     Telemedicine Visit: The patient's condition can be safely assessed and treated via synchronous audio and visual telemedicine encounter.      Reason for Telemedicine Visit:  covid 19     Originating Site (Patient Location): Patient's home    Distant Site (Provider Location): Provider Remote Setting- Home Office    Consent:  The patient/guardian has verbally consented to: the potential risks and benefits of telemedicine (video visit) versus in person care; bill my insurance or make self-payment for services provided; and responsibility for payment of non-covered services.     Patient would like the video invitation sent by:  My Chart    Mode of Communication:  Video Conference via Medical Zoom    As the provider I attest to compliance with applicable laws and regulations related to telemedicine.          NUMBER OF PARTICIPANTS: 7    Summary of Group / Topics Discussed:  Mindfulness: Mindfulness Experiential: Patients received an overview on what mindfulness is and how mindfulness can benefit general health, mental health symptoms, and stressors. The history of mindfulness, its application to mental health therapies, and key concepts were also discussed. Patients discussed current awareness, knowledge, and practice of mindfulness skills. Patients also discussed barriers to mindfulness practice.    Patient Session Goals / Objectives:    Demonstrated and verbalized understanding of key mindfulness concepts    Identified when/how to use mindfulness skills    Resolved barriers to practicing mindfulness skills    Identified plan to use  mindfulness skills in daily life       Patient Participation / Response:  Fully participated with the group by sharing personal reflections / insights and openly received / provided feedback with other participants.    Practiced skills in session    Treatment Plan:  Patient has a current master individualized treatment plan.  See Epic treatment plan for more information.    Hattie Virk

## 2021-11-04 ENCOUNTER — HOSPITAL ENCOUNTER (OUTPATIENT)
Dept: BEHAVIORAL HEALTH | Facility: CLINIC | Age: 21
End: 2021-11-04
Attending: PSYCHIATRY & NEUROLOGY
Payer: COMMERCIAL

## 2021-11-04 PROCEDURE — 90853 GROUP PSYCHOTHERAPY: CPT | Mod: GT,95

## 2021-11-04 PROCEDURE — 90853 GROUP PSYCHOTHERAPY: CPT | Mod: GT,95 | Performed by: SOCIAL WORKER

## 2021-11-04 NOTE — GROUP NOTE
Psychotherapy Group Note    PATIENT'S NAME: Francesca Griffin  MRN:   [id number]  :   2000  ACCT. NUMBER: [id number]  DATE OF SERVICE: 21  START TIME:  3:00 PM  END TIME:  3:50 PM  FACILITATOR: Adrianna Montoya LGSW  TOPIC: MH EBP Group: Behavioral Activation  [facility] Adult Mental Health Day Treatment  TRACK: 4B    NUMBER OF PARTICIPANTS:     Summary of Group / Topics Discussed:  Behavioral Activation: Motivation and Procrastination: Patients explored how they currently spend their time, identifying thoughts and feelings that are motivating and serve to increase desired behaviors.  They also examined behaviors that contribute to procrastination.  Different types of procrastination behaviors were identified, and strategies to reduce individual procrastination and increase motivation were explored and practiced.  Patients identified ways to increase goal-directed activities to enhance mood and reduce symptoms.        Patient Session Goals / Objectives:    Identify current patterns of procrastination behavior and how they influence thoughts and moods, and inhibit motivation.    Identify behaviors that can be implemented that contribute to improving thoughts and feelings, motivation, and reduce symptoms.    Identify and develop a plan to increase activities that promote a sense of accomplishment and competence.    Practice scheduling positive activities / behaviors into daily routines.                                      Service Modality:  Video Visit     Telemedicine Visit: The patient's condition can be safely assessed and treated via synchronous audio and visual telemedicine encounter.      Reason for Telemedicine Visit: Services only offered telehealth    Originating Site (Patient Location): Patient's home    Distant Site (Provider Location): Provider Remote Setting- Home Office    Consent:  The patient/guardian has verbally consented to: the potential risks and benefits of telemedicine (video  visit) versus in person care; bill my insurance or make self-payment for services provided; and responsibility for payment of non-covered services.     Patient would like the video invitation sent by:  My Chart    Mode of Communication:  Video Conference via Medical Zoom    As the provider I attest to compliance with applicable laws and regulations related to telemedicine.            Patient Participation / Response:  Fully participated with the group by sharing personal reflections / insights and openly received / provided feedback with other participants.    Demonstrated understanding of topics discussed through group discussion and participation, Expressed understanding of the relationship between behaviors, thoughts, and feelings and Shared experiences and challenges with making behavioral changes    Treatment Plan:  Patient has a current master individualized treatment plan.  See Epic treatment plan for more information.    Adrianna Montoya LGSW

## 2021-11-04 NOTE — GROUP NOTE
Psychotherapy Group Note    PATIENT'S NAME: Francesca Griffin  MRN:   [id number]  :   2000  ACCT. NUMBER: [id number]  DATE OF SERVICE: 21  START TIME:  1:00 PM  END TIME:  1:50 PM  FACILITATOR: Adrianna Montoya LGSW  TOPIC: MH EBP Group: Behavioral Activation  [facility] Adult Mental Health Day Treatment  TRACK: 4B    NUMBER OF PARTICIPANTS:     Summary of Group / Topics Discussed:  Behavioral Activation: Activity Scheduling:Patients explored how they currently spend their time, and how specific behaviors impact thoughts and feelings.  The group explored the effect of negative and positive activities on mood states and thought patterns.  Patients identified activities that help to improve mood and thinking patterns, and developed a plan to implement positive activities between sessions.      Patient Session Goals / Objectives:    Identify impact of current behaviors on thoughts and mood    Identify 2-3 behavioral changes that could have a positive impact on thoughts and mood    Prepare to make desired behavioral change: Create a change plan / activity schedule                                      Service Modality:  Video Visit     Telemedicine Visit: The patient's condition can be safely assessed and treated via synchronous audio and visual telemedicine encounter.      Reason for Telemedicine Visit: Services only offered telehealth    Originating Site (Patient Location): Patient's home    Distant Site (Provider Location): Provider Remote Setting- Home Office    Consent:  The patient/guardian has verbally consented to: the potential risks and benefits of telemedicine (video visit) versus in person care; bill my insurance or make self-payment for services provided; and responsibility for payment of non-covered services.     Patient would like the video invitation sent by:  My Chart    Mode of Communication:  Video Conference via Medical Zoom    As the provider I attest to compliance with applicable laws  and regulations related to telemedicine.            Patient Participation / Response:  Fully participated with the group by sharing personal reflections / insights and openly received / provided feedback with other participants.    Demonstrated understanding of topics discussed through group discussion and participation, Expressed understanding of the relationship between behaviors, thoughts, and feelings and Shared experiences and challenges with making behavioral changes    Treatment Plan:  Patient has a current master individualized treatment plan.  See Epic treatment plan for more information.    Adrianna Montoya LGSW

## 2021-11-04 NOTE — GROUP NOTE
Process Group Note    PATIENT'S NAME: Francesca Griffin  MRN:   [id number]  :   2000  ACCT. NUMBER: [id number]  DATE OF SERVICE: 21  START TIME:  2:00 PM  END TIME:  2:50 PM  FACILITATOR: Perla Garvin [facility]  TOPIC:  Process Group    Diagnoses:  296.33 (F33.2) Major Depressive Disorder, Recurrent Episode, Severe _ and With mixed features.  300.01 (F41.0) Panic Disorder  300.00 (F41.9) Unspecified Anxiety Disorder.      [facility] Mental Health Day Treatment  TRACK: 4B    NUMBER OF PARTICIPANTS: 5                                      Service Modality:  Video Visit     Telemedicine Visit: The patient's condition can be safely assessed and treated via synchronous audio and visual telemedicine encounter.      Reason for Telemedicine Visit: Services only offered telehealth    Originating Site (Patient Location): Patient's home    Distant Site (Provider Location): Provider Remote Setting- Home Office    Consent:  The patient/guardian has verbally consented to: the potential risks and benefits of telemedicine (video visit) versus in person care; bill my insurance or make self-payment for services provided; and responsibility for payment of non-covered services.     Patient would like the video invitation sent by:  My Chart    Mode of Communication:  Video Conference via Medical Zoom    As the provider I attest to compliance with applicable laws and regulations related to telemedicine.               Data:    Session content: At the start of this group, patients were invited to check in by identifying themselves, describing their current emotional status, and identifying issues to address in this group.   Area(s) of treatment focus addressed in this session included Symptom Management, Personal Safety and Community Resources/Discharge Planning.  Francesca reported getting sick on Monday with ear and eye symptoms.  They stated that they feel less hopeful, more depressed, and are having passive suicidal  "thoughts without plan or intent.  They also reported having some intrusive thoughts of self-harm, without intention or plan to act.   They shared coping plan.   They shared meeting with their psychiatrist and having a medication increase.   They have a next medication plan with their provider at [place] if this is not helpful.      Therapeutic Interventions/Treatment Strategies:  Psychotherapist offered support, feedback and validation and reinforced use of skills. Treatment modalities used include Cognitive Behavioral Therapy. Interventions include Cognitive Restructuring:  Assisted patient in formulating new neutral/positive alternatives to challenge less helpful / ineffective thoughts and Facilitated recognition of the connection between negative thoughts and negative core beliefs and Coping Skills: Discussed how the use of intentional \"in the moment\" actions can help reduce distress.    Assessment:    Patient response:   Patient responded to session by listening and focusing on goals    Possible barriers to participation / learning include: and no barriers identified    Health Issues:   None reported       Substance Use Review:   Substance Use: No active concerns identified.    Mental Status/Behavioral Observations  Appearance:   Appropriate   Eye Contact:   Good   Psychomotor Behavior: Normal   Attitude:   Cooperative   Orientation:   All  Speech   Rate / Production: Normal    Volume:  Normal   Mood:    Anxious  Depressed   Affect:    Appropriate   Thought Content:   Clear  Thought Form:  Coherent  Logical     Insight:    Good     Plan:     Safety Plan: No current safety concerns identified.  Recommended that patient call 911 or go to the local ED should there be a change in any of these risk factors.     Barriers to treatment: None identified    Patient Contracts (see media tab):  None    Substance Use: Not addressed in session     Continue or Discharge: Patient will continue in Adult Day Treatment (ADT)  as " planned. Patient is likely to benefit from learning and using skills as they work toward the goals identified in their treatment plan.      Perla Garvin, [facility]  November 4, 2021

## 2021-11-08 ENCOUNTER — HOSPITAL ENCOUNTER (OUTPATIENT)
Dept: BEHAVIORAL HEALTH | Facility: CLINIC | Age: 21
End: 2021-11-08
Attending: PSYCHIATRY & NEUROLOGY
Payer: COMMERCIAL

## 2021-11-08 ENCOUNTER — TRANSCRIBE ORDERS (OUTPATIENT)
Dept: OTHER | Age: 21
End: 2021-11-08
Payer: COMMERCIAL

## 2021-11-08 DIAGNOSIS — G44.209 TENSION HEADACHE: ICD-10-CM

## 2021-11-08 DIAGNOSIS — G43.109 MIGRAINE WITH AURA: Primary | ICD-10-CM

## 2021-11-08 PROCEDURE — 90853 GROUP PSYCHOTHERAPY: CPT | Mod: GT,95 | Performed by: SOCIAL WORKER

## 2021-11-08 PROCEDURE — 90853 GROUP PSYCHOTHERAPY: CPT | Mod: GT,95
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
## 2021-11-08 NOTE — GROUP NOTE
Psychotherapy Group Note    PATIENT'S NAME: Francesca Griffin  MRN:   [id number]  :   2000  ACCT. NUMBER: [id number]  DATE OF SERVICE: 21  START TIME:  3:00 PM  END TIME:  3:50 PM  FACILITATOR: Hattie Virk  TOPIC: MH EBP Group: Specialty Awareness  [facility] Mental Health Day Treatment  TRACK: 4B                                      Service Modality:  Video Visit     Telemedicine Visit: The patient's condition can be safely assessed and treated via synchronous audio and visual telemedicine encounter.      Reason for Telemedicine Visit:  covid 19    Originating Site (Patient Location): Patient's home    Distant Site (Provider Location): Provider Remote Setting- Home Office    Consent:  The patient/guardian has verbally consented to: the potential risks and benefits of telemedicine (video visit) versus in person care; bill my insurance or make self-payment for services provided; and responsibility for payment of non-covered services.     Patient would like the video invitation sent by:  My Chart    Mode of Communication:  Video Conference via Medical Zoom    As the provider I attest to compliance with applicable laws and regulations related to telemedicine.          NUMBER OF PARTICIPANTS: 6    Summary of Group / Topics Discussed:  Specialty Topics: Hope: The topic of hope was presented in order to help patients better understand the symptoms of hopelessness and how to become more hopeful. Patients discussed their current awareness of the topic and relevance to their functioning. Individual experiences with symptoms and treatment options were also discussed. Patients explored options for ongoing/future treatment and symptom management.      Patient Session Goals / Objectives:    Discussed definition of hopelessness    Discussed how hopelessness impacts functioning    Set a plan to utilize skills to reduce hopelessness      Patient Participation / Response:  Fully participated with the  group by sharing personal reflections / insights and openly received / provided feedback with other participants.    Demonstrated understanding of topics discussed through group discussion and participation, Identified / Expressed readiness to act on skill suggestions discussed in topic and Verbalized understanding of ways to proactively manage illness    Treatment Plan:  Patient has a current master individualized treatment plan.  See Epic treatment plan for more information.    Hattie Virk

## 2021-11-09 ENCOUNTER — HOSPITAL ENCOUNTER (OUTPATIENT)
Dept: BEHAVIORAL HEALTH | Facility: CLINIC | Age: 21
End: 2021-11-09
Attending: PSYCHIATRY & NEUROLOGY
Payer: COMMERCIAL

## 2021-11-09 PROCEDURE — 90853 GROUP PSYCHOTHERAPY: CPT | Mod: GT,95

## 2021-11-09 NOTE — PROGRESS NOTES
Psychiatry Provider Staffing Note    Met today with treatment team to discuss case, progress.    Francesca Griffin is a 21 year old adult enrolled in Adult Day Treatment.      Medications:   Current Outpatient Medications   Medication     buPROPion (WELLBUTRIN) 100 MG tablet     DULoxetine (CYMBALTA) 60 MG capsule     gabapentin (NEURONTIN) 300 MG/6ML oral solution     hydrOXYzine (VISTARIL) 25 MG capsule     ondansetron (ZOFRAN) 8 MG tablet     propranolol ER (INDERAL LA) 60 MG 24 hr capsule     rizatriptan (MAXALT-MLT) 5 MG ODT     No current facility-administered medications for this encounter.         Diagnoses reviewed and include:  Patient Active Problem List   Diagnosis     Major depressive disorder, recurrent episode, severe with anxious distress (H)       Pertinent/updates:    Panic disorder    Unspecified anxiety disorder    attention deficit and hyperactivity disorder     Care team notes and discussion:    Uses they/them pronouns    Was on leave from college, returning, working on incompletes/how to balance with current classes    Supportive parents    Working well in group    Developing coping skills    Has outpatient psychiatry, therapy    No suicidal ideation, no acute safety concerns      Patient continues to meet criteria for program.  Continue plan of care.    Kashif Ford MD on 11/9/2021 at 8:32 AM

## 2021-11-09 NOTE — GROUP NOTE
Psychotherapy Group Note    PATIENT'S NAME: Francesca Griffin  MRN:   [id number]  :   2000  ACCT. NUMBER: [id number]  DATE OF SERVICE: 21  START TIME:  2:00 PM  END TIME:  2:50 PM  FACILITATOR: Perla Garvin [facility]  TOPIC: MH EBP Group: Coping Skills  [facility] Adult Mental Health Day Treatment  TRACK: 4B    NUMBER OF PARTICIPANTS: 7    Summary of Group / Topics Discussed:  Coping Skills: Additional Coping Skills:  Patients discussed and practiced skills to manage intrusive thoughts.  Reviewed the benefits of applying the aforementioned coping strategies.  Patients explored how these strategies might be applied to daily stressors or distressing situations.    Patient Session Goals / Objectives:    Understand the purpose and benefits of applying thought stopping,, mental engagement in activities such as word games, and other coping strategies    Clients shared their experiences with intrusive thoughts of different types.    Address barriers to utilizing coping skills when in distress.                                    Service Modality:  Video Visit     Telemedicine Visit: The patient's condition can be safely assessed and treated via synchronous audio and visual telemedicine encounter.      Reason for Telemedicine Visit: Patient has requested telehealth visit    Originating Site (Patient Location): Patient's home    Distant Site (Provider Location): Provider Remote Setting- Home Office    Consent:  The patient/guardian has verbally consented to: the potential risks and benefits of telemedicine (video visit) versus in person care; bill my insurance or make self-payment for services provided; and responsibility for payment of non-covered services.     Patient would like the video invitation sent by:  My Chart    Mode of Communication:  Video Conference via Medical Zoom    As the provider I attest to compliance with applicable laws and regulations related to telemedicine.             Patient  Participation / Response:  Fully participated with the group by sharing personal reflections / insights and openly received / provided feedback with other participants.    Expressed understanding of the relevance / importance of coping skills at distressing times in life and Practiced 2-3 new coping skills in session    Treatment Plan:  Patient has a current master individualized treatment plan.  See Epic treatment plan for more information.    Perla Garvin, [facility] HospitalSW

## 2021-11-09 NOTE — GROUP NOTE
"Process Group Note    PATIENT'S NAME: Francesca Griffin  MRN:   [id number]  :   2000  ACCT. NUMBER: [id number]  DATE OF SERVICE: 21  START TIME:  1:00 PM  END TIME:  1:50 PM  FACILITATOR: Adrianna Montoya LGSW  TOPIC:  Process Group    Diagnoses:   Principal DSM5 Diagnoses  (Sustained by DSM5 Criteria Listed Above):   296.33 (F33.2) Major Depressive Disorder, Recurrent Episode, Severe _ and With mixed features.  4. Other Diagnoses that is relevant to services:   300.01 (F41.0) Panic Disorder  300.00 (F41.9) Unspecified Anxiety Disorder.      [facility] Day Treatment  TRACK: 4B    NUMBER OF PARTICIPANTS: 7                                      Service Modality:  Video Visit     Telemedicine Visit: The patient's condition can be safely assessed and treated via synchronous audio and visual telemedicine encounter.      Reason for Telemedicine Visit: Services only offered telehealth    Originating Site (Patient Location): Patient's home    Distant Site (Provider Location): Provider Remote Setting- Home Office    Consent:  The patient/guardian has verbally consented to: the potential risks and benefits of telemedicine (video visit) versus in person care; bill my insurance or make self-payment for services provided; and responsibility for payment of non-covered services.     Patient would like the video invitation sent by:  My Chart    Mode of Communication:  Video Conference via Medical Zoom    As the provider I attest to compliance with applicable laws and regulations related to telemedicine.                Data:    Session content: At the start of this group, patients were invited to check in by identifying themselves, describing their current emotional status, and identifying issues to address in this group.   Area(s) of treatment focus addressed in this session included Symptom Management, Personal Safety and Community Resources/Discharge Planning.  Client reported feeling \"scattered\" " "and agitated today.  Client reported feelings of shame surrounding their forgetfulness related to their ADHD diagnosis.  Client reported difficulty finding items they needed to leave the house and as a result was late to an appointment they had this morning.  \"It felt like personal failing\".  Client reported feeling distrustful of their memory.  Writer offered supportive feedback and client specified their need for validation instead of advice.  \"I don't want tips and tricks, I am happy to commiserate\".  Peers offered supportive validation.  Client did not report any suicidal ideation, plan or intent.    Therapeutic Interventions/Treatment Strategies:  Psychotherapist offered support, feedback and validation and reinforced use of skills. Treatment modalities used include Cognitive Behavioral Therapy. Interventions include Symptoms Management: Promoted understanding of their diagnoses and how it impacts their functioning.    Assessment:    Patient response:   Patient responded to session by accepting feedback, giving feedback and listening    Possible barriers to participation / learning include: and no barriers identified    Health Issues:   None reported       Substance Use Review:   Substance Use: No active concerns identified.    Mental Status/Behavioral Observations  Appearance:   Appropriate   Eye Contact:   Good   Psychomotor Behavior: Normal   Attitude:   Cooperative  Interested Pleasant  Orientation:   All  Speech   Rate / Production: Normal    Volume:  Normal   Mood:    Anxious  Depressed  Irritable   Affect:    Appropriate   Thought Content:   Clear and Safety denies any current safety concerns including suicidal ideation, self-harm, and homicidal ideation  Thought Form:  Coherent  Logical     Insight:    Good     Plan:     Safety Plan: No current safety concerns identified.  Recommended that patient call 911 or go to the local ED should there be a change in any of these risk factors.     Barriers to " treatment: None identified    Patient Contracts (see media tab):  None    Substance Use: Not addressed in session     Continue or Discharge: Patient will continue in Adult Day Treatment (ADT)  as planned. Patient is likely to benefit from learning and using skills as they work toward the goals identified in their treatment plan.      Adrianna Montoya, MANNY  November 9, 2021

## 2021-11-09 NOTE — GROUP NOTE
Psychotherapy Group Note    PATIENT'S NAME: Francesca Griffin  MRN:   [id number]  :   2000  ACCT. NUMBER: [id number]  DATE OF SERVICE: 21  START TIME:  2:00 PM  END TIME:  2:50 PM  FACILITATOR: Hattie Virk  TOPIC: MH EBP Group: [facility] Adult Mental Health Day Treatment  TRACK: 4B                                      Service Modality:  Video Visit     Telemedicine Visit: The patient's condition can be safely assessed and treated via synchronous audio and visual telemedicine encounter.      Reason for Telemedicine Visit:  covid 19     Originating Site (Patient Location): Patient's home    Distant Site (Provider Location): Provider Remote Setting- Home Office    Consent:  The patient/guardian has verbally consented to: the potential risks and benefits of telemedicine (video visit) versus in person care; bill my insurance or make self-payment for services provided; and responsibility for payment of non-covered services.     Patient would like the video invitation sent by:  My Chart    Mode of Communication:  Video Conference via Medical Zoom    As the provider I attest to compliance with applicable laws and regulations related to telemedicine.          NUMBER OF PARTICIPANTS: 7    Summary of Group / Topics Discussed:  Mindfulness: Mindfulness Skills: Patients received information on the main components of mindfulness. Patients participated in discussion on how to practice observing, describing, and participating in internal and external environments. Relevance of mindfulness skills to overall mental and physical health was explored.  Patients explored and discussed in group their current awareness and knowledge of mindfulness skills as well as barriers to applying skills.    Patient Session Goals / Objectives:    Demonstrated and verbalized understanding of key mindfulness concepts    Identified when/how to use mindfulness skills    Resolved barriers to practicing mindfulness  skills    Identified plan to use mindfulness skills in daily life       Patient Participation / Response:  Fully participated with the group by sharing personal reflections / insights and openly received / provided feedback with other participants.    Demonstrated understanding of topics discussed through group discussion and participation, Demonstrated understanding of mindfulness skills and benefits of practice and Identified / Expressed personal readiness to practice mindfulness skills    Treatment Plan:  Patient has a current master individualized treatment plan.  See Epic treatment plan for more information.    Hattie Virk

## 2021-11-09 NOTE — GROUP NOTE
Psychotherapy Group Note    PATIENT'S NAME: Francesca Griffin  MRN:   [id number]  :   2000  ACCT. NUMBER: [id number]  DATE OF SERVICE: 21  START TIME:  3:00 PM  END TIME:  3:50 PM  FACILITATOR: Hattie Virk  TOPIC: MH EBP Group: [facility] Adult Mental Health Day Treatment  TRACK: 4B                                      Service Modality:  Video Visit     Telemedicine Visit: The patient's condition can be safely assessed and treated via synchronous audio and visual telemedicine encounter.      Reason for Telemedicine Visit:  covid 19     Originating Site (Patient Location): Patient's home    Distant Site (Provider Location): Provider Remote Setting- Home Office    Consent:  The patient/guardian has verbally consented to: the potential risks and benefits of telemedicine (video visit) versus in person care; bill my insurance or make self-payment for services provided; and responsibility for payment of non-covered services.     Patient would like the video invitation sent by:  My Chart    Mode of Communication:  Video Conference via Medical Zoom    As the provider I attest to compliance with applicable laws and regulations related to telemedicine.          NUMBER OF PARTICIPANTS: 7    Summary of Group / Topics Discussed:  Mindfulness: Mindfulness Skills: Patients received information on the main components of mindfulness. Patients participated in discussion on how to practice observing, describing, and participating in internal and external environments. Relevance of mindfulness skills to overall mental and physical health was explored.  Patients explored and discussed in group their current awareness and knowledge of mindfulness skills as well as barriers to applying skills.    Patient Session Goals / Objectives:    Demonstrated and verbalized understanding of key mindfulness concepts    Identified when/how to use mindfulness skills    Resolved barriers to practicing mindfulness  skills    Identified plan to use mindfulness skills in daily life       Patient Participation / Response:  Fully participated with the group by sharing personal reflections / insights and openly received / provided feedback with other participants.    Demonstrated understanding of topics discussed through group discussion and participation, Demonstrated understanding of mindfulness skills and benefits of practice and Identified / Expressed personal readiness to practice mindfulness skills    Treatment Plan:  Patient has a current master individualized treatment plan.  See Epic treatment plan for more information.    Hattie Virk

## 2021-11-10 ENCOUNTER — HOSPITAL ENCOUNTER (OUTPATIENT)
Dept: BEHAVIORAL HEALTH | Facility: CLINIC | Age: 21
End: 2021-11-10
Attending: PSYCHIATRY & NEUROLOGY
Payer: COMMERCIAL

## 2021-11-10 PROCEDURE — 99215 OFFICE O/P EST HI 40 MIN: CPT | Mod: 95 | Performed by: PSYCHIATRY & NEUROLOGY

## 2021-11-10 PROCEDURE — 99207 PR CDG-CODE INCORRECT PER BILLING BASED ON TIME: CPT | Performed by: PSYCHIATRY & NEUROLOGY

## 2021-11-10 RX ORDER — BUPROPION HYDROCHLORIDE 100 MG/1
300 TABLET, EXTENDED RELEASE ORAL DAILY
COMMUNITY

## 2021-11-10 RX ORDER — GABAPENTIN 300 MG/1
300 CAPSULE ORAL 3 TIMES DAILY
COMMUNITY
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
## 2021-11-11 ENCOUNTER — HOSPITAL ENCOUNTER (OUTPATIENT)
Dept: BEHAVIORAL HEALTH | Facility: CLINIC | Age: 21
End: 2021-11-11
Attending: PSYCHIATRY & NEUROLOGY
Payer: COMMERCIAL

## 2021-11-11 PROCEDURE — 90853 GROUP PSYCHOTHERAPY: CPT | Mod: GT,95

## 2021-11-11 NOTE — GROUP NOTE
Process Group Note    PATIENT'S NAME: Francesca Griffin  MRN:   [id number]  :   2000  ACCT. NUMBER: [id number]  DATE OF SERVICE: 21  START TIME:  2:00 PM  END TIME:  2:50 PM  FACILITATOR: Adrianna Montoya LGSW  TOPIC:  Process Group    Diagnoses:   Principal DSM5 Diagnoses  (Sustained by DSM5 Criteria Listed Above):   296.33 (F33.2) Major Depressive Disorder, Recurrent Episode, Severe _ and With mixed features.  4. Other Diagnoses that is relevant to services:   300.01 (F41.0) Panic Disorder  300.00 (F41.9) Unspecified Anxiety Disorder.      [facility] Day Treatment  TRACK: 4B    NUMBER OF PARTICIPANTS: 7                                      Service Modality:  Video Visit     Telemedicine Visit: The patient's condition can be safely assessed and treated via synchronous audio and visual telemedicine encounter.      Reason for Telemedicine Visit: Services only offered telehealth    Originating Site (Patient Location): Patient's home    Distant Site (Provider Location): Provider Remote Setting- Home Office    Consent:  The patient/guardian has verbally consented to: the potential risks and benefits of telemedicine (video visit) versus in person care; bill my insurance or make self-payment for services provided; and responsibility for payment of non-covered services.     Patient would like the video invitation sent by:  My Chart    Mode of Communication:  Video Conference via Medical Zoom    As the provider I attest to compliance with applicable laws and regulations related to telemedicine.                Data:    Session content: At the start of this group, patients were invited to check in by identifying themselves, describing their current emotional status, and identifying issues to address in this group.   Area(s) of treatment focus addressed in this session included Symptom Management, Personal Safety and Community Resources/Discharge Planning.    Client reported feeling better  "physically, citing increased energy.  Client reported increased rumination at night despite using distraction skills during the day.  Client reports having the goal of taking time to \"process and journal\".  Client reported feeling scattered and not in control of their thought process.  Client reported drawing during group and showed drawings they made the last few group sessions.  Client also reported baking bread as a self-soothing strategy.  Client reported their psychiatrist increasing in Wellbutrin to 200mg.  Client has not picked up the medication since it was refilled last week.  Writer and client brainstormed ways to overcome barrier of picking up prescriptions.  Client reported that their \"eating has gotten bad\" and that they have difficulty figuring out what to eat.  Client reports eating in group as a strategy to intentionally take time to eat. Client did not report any suicidal ideation, plan or intent.        Therapeutic Interventions/Treatment Strategies:  Psychotherapist offered support, feedback and validation and reinforced use of skills. Treatment modalities used include Cognitive Behavioral Therapy. Interventions include Behavioral Activation: Reinforced benefits/challenges of change process through applying skills to replace unwanted behaviors and Coping Skills: Discussed use of self-soothe skills to decrease distress in the body and Assisted patient in identifying 1-2 healthy distraction skills to reduce overall distress.    Assessment:    Patient response:   Patient responded to session by accepting feedback, giving feedback and listening    Possible barriers to participation / learning include: and no barriers identified    Health Issues:   None reported       Substance Use Review:   Substance Use: No active concerns identified.    Mental Status/Behavioral Observations  Appearance:   Appropriate   Eye Contact:   Good   Psychomotor Behavior: Normal   Attitude:   Cooperative  Interested Friendly " Pleasant  Orientation:   All  Speech   Rate / Production: Normal    Volume:  Normal   Mood:    Anxious  Depressed   Affect:    Appropriate   Thought Content:   Clear and Safety denies any current safety concerns including suicidal ideation, self-harm, and homicidal ideation  Thought Form:  Coherent  Logical     Insight:    Good     Plan:     Safety Plan: No current safety concerns identified.  Recommended that patient call 911 or go to the local ED should there be a change in any of these risk factors.     Barriers to treatment: None identified    Patient Contracts (see media tab):  None    Substance Use: Not addressed in session     Continue or Discharge: Patient will continue in Adult Day Treatment (ADT)  as planned. Patient is likely to benefit from learning and using skills as they work toward the goals identified in their treatment plan.      MANNY Farfan  November 11, 2021

## 2021-11-11 NOTE — GROUP NOTE
Psychotherapy Group Note    PATIENT'S NAME: Franecsca Griffin  MRN:   [id number]  :   2000  ACCT. NUMBER: [id number]  DATE OF SERVICE: 21  START TIME:  3:00 PM  END TIME:  3:50 PM  FACILITATOR: Hattie Virk  TOPIC: MH EBP Group: Specialty Awareness  [facility] Adult Mental Health Day Treatment  TRACK: 4B                                      Service Modality:  Video Visit     Telemedicine Visit: The patient's condition can be safely assessed and treated via synchronous audio and visual telemedicine encounter.      Reason for Telemedicine Visit:  covid 19     Originating Site (Patient Location): Patient's home    Distant Site (Provider Location): Provider Remote Setting- Home Office    Consent:  The patient/guardian has verbally consented to: the potential risks and benefits of telemedicine (video visit) versus in person care; bill my insurance or make self-payment for services provided; and responsibility for payment of non-covered services.     Patient would like the video invitation sent by:  My Chart    Mode of Communication:  Video Conference via Medical Zoom    As the provider I attest to compliance with applicable laws and regulations related to telemedicine.          NUMBER OF PARTICIPANTS: 6    Summary of Group / Topics Discussed:  Specialty Topics: Forgiveness: Patients were provided with an overview of the topic of forgiveness including how to better understand the nature of forgiveness of others and oneself. Exploring the phases of forgiveness and how one works them was covered. Patients were able to explore how practicing forgiveness may positively impact their functioning.     Patient Session Goals / Objectives:    Discussed definitions of forgiveness and self-forgiveness    Explored the phases and process of forgiveness    Discussed benefits of forgiveness to their relationships with themselves and others, connecting the concept to mindfulness and acceptance    Assisted patients  in recognizing when practicing forgiveness may be helpful      Patient Participation / Response:  Fully participated with the group by sharing personal reflections / insights and openly received / provided feedback with other participants.    Demonstrated understanding of topics discussed through group discussion and participation, Identified / Expressed readiness to act on skill suggestions discussed in topic and Verbalized understanding of ways to proactively manage illness    Treatment Plan:  Patient has a current master individualized treatment plan.  See Epic treatment plan for more information.    Hattie Virk

## 2021-11-11 NOTE — GROUP NOTE
Psychotherapy Group Note    PATIENT'S NAME: Francesca Griffin  MRN:   [id number]  :   2000  ACCT. NUMBER: [id number]  DATE OF SERVICE: 21  START TIME:  1:00 PM  END TIME:  1:50 PM  FACILITATOR: Hattie Virk  TOPIC: MH EBP Group: Specialty Awareness  [facility] Adult Mental Health Day Treatment  TRACK: 4B                                      Service Modality:  Video Visit     Telemedicine Visit: The patient's condition can be safely assessed and treated via synchronous audio and visual telemedicine encounter.      Reason for Telemedicine Visit:  covid 19     Originating Site (Patient Location): Patient's home    Distant Site (Provider Location): Provider Remote Setting- Home Office    Consent:  The patient/guardian has verbally consented to: the potential risks and benefits of telemedicine (video visit) versus in person care; bill my insurance or make self-payment for services provided; and responsibility for payment of non-covered services.     Patient would like the video invitation sent by:  My Chart    Mode of Communication:  Video Conference via Medical Zoom    As the provider I attest to compliance with applicable laws and regulations related to telemedicine.         NUMBER OF PARTICIPANTS: 7    Summary of Group / Topics Discussed:  Specialty Topics: Forgiveness: Patients were provided with an overview of the topic of forgiveness including how to better understand the nature of forgiveness of others and oneself. Exploring the phases of forgiveness and how one works them was covered. Patients were able to explore how practicing forgiveness may positively impact their functioning.     Patient Session Goals / Objectives:    Discussed definitions of forgiveness and self-forgiveness    Explored the phases and process of forgiveness    Discussed benefits of forgiveness to their relationships with themselves and others, connecting the concept to mindfulness and acceptance    Assisted patients in  recognizing when practicing forgiveness may be helpful      Patient Participation / Response:  Fully participated with the group by sharing personal reflections / insights and openly received / provided feedback with other participants.    Demonstrated understanding of topics discussed through group discussion and participation, Identified / Expressed readiness to act on skill suggestions discussed in topic and Verbalized understanding of ways to proactively manage illness    Treatment Plan:  Patient has a current master individualized treatment plan.  See Epic treatment plan for more information.    Hattie Virk

## 2021-11-15 ENCOUNTER — HOSPITAL ENCOUNTER (OUTPATIENT)
Dept: BEHAVIORAL HEALTH | Facility: CLINIC | Age: 21
End: 2021-11-15
Attending: PSYCHIATRY & NEUROLOGY
Payer: COMMERCIAL

## 2021-11-15 PROCEDURE — 90853 GROUP PSYCHOTHERAPY: CPT | Mod: GT,95

## 2021-11-15 NOTE — GROUP NOTE
Psychotherapy Group Note    PATIENT'S NAME: Francesca Griffin  MRN:   [id number]  :   2000  ACCT. NUMBER: [id number]  DATE OF SERVICE: 11/15/21  START TIME:  3:00 PM  END TIME:  3:50 PM  FACILITATOR: Hattie Virk  TOPIC: MH EBP Group: Relationship Skills  [facility] Adult Mental Health Day Treatment  TRACK: 4B                                      Service Modality:  Video Visit     Telemedicine Visit: The patient's condition can be safely assessed and treated via synchronous audio and visual telemedicine encounter.      Reason for Telemedicine Visit: Services only offered telehealth    Originating Site (Patient Location): Patient's home    Distant Site (Provider Location): Provider Remote Setting- Home Office    Consent:  The patient/guardian has verbally consented to: the potential risks and benefits of telemedicine (video visit) versus in person care; bill my insurance or make self-payment for services provided; and responsibility for payment of non-covered services.     Patient would like the video invitation sent by:  My Chart    Mode of Communication:  Video Conference via Medical Zoom    As the provider I attest to compliance with applicable laws and regulations related to telemedicine.          NUMBER OF PARTICIPANTS: 8    Summary of Group / Topics Discussed:  Relationship Skills: Boundaries: Patients were provided with a general overview of interpersonal boundaries and how lack of boundaries relates to symptoms and functioning. The purpose is to help patients identify boundary issues and gain awareness and skills to work towards healthier interpersonal boundaries. Current awareness of healthy boundary characteristics and barriers to establishing healthy boundaries were discussed.    Patient Session Goals / Objectives:    Familiarized patients with the concept of interpersonal boundaries and their characteristics    Discussed and practiced strategies to promote healthier interpersonal  boundaries    Identified boundary issues and identified plan to improve boundaries      Patient Participation / Response:  Fully participated with the group by sharing personal reflections / insights and openly received / provided feedback with other participants.    Demonstrated understanding of topics discussed through group discussion and participation, Demonstrated understanding of relationship skills and communication skills and Identified / Expressed personal readiness to incorporate effective communication skills    Treatment Plan:  Patient has a current master individualized treatment plan.  See Epic treatment plan for more information.    Hattie Virk

## 2021-11-15 NOTE — GROUP NOTE
Psychoeducation Group Note    PATIENT'S NAME: Francesca Griffin  MRN:   [id number]  :   2000  ACCT. NUMBER: [id number]  DATE OF SERVICE: 11/15/21  START TIME:  2:00 PM  END TIME:  2:50 PM  FACILITATOR: Adrianna Montoya LGSW; Perla Garvin LICSW  TOPIC:  RN Group: Health Maintenance  [facility] Mental Health Day Treatment  TRACK: 4B    NUMBER OF PARTICIPANTS:     Summary of Group / Topics Discussed:  Health Maintenance: Eight Dimensions of Wellness: The concept of holistic health through the model of eight dimensions was introduced. Group members participated in identifying behaviors and activities in each of the dimensions of wellness.  The importance of each dimension was reinforced and the concept of balance in life as it relates to wellness was explored.      Patient Session Goals / Objectives:    Verbalized understanding of balance in wellness and how it relates to their life    Identified and explained the eight dimensions of wellness    Categorized activities and wellness needs into corresponding dimensions appropriately during exercise                                        Service Modality:  Video Visit     Telemedicine Visit: The patient's condition can be safely assessed and treated via synchronous audio and visual telemedicine encounter.      Reason for Telemedicine Visit: Services only offered telehealth    Originating Site (Patient Location): Patient's home    Distant Site (Provider Location): Provider Remote Setting- Home Office    Consent:  The patient/guardian has verbally consented to: the potential risks and benefits of telemedicine (video visit) versus in person care; bill my insurance or make self-payment for services provided; and responsibility for payment of non-covered services.     Patient would like the video invitation sent by:  My Chart    Mode of Communication:  Video Conference via Medical Zoom    As the provider I attest to compliance with applicable laws and  regulations related to telemedicine.            Patient Participation / Response:  Fully participated with the group by sharing personal reflections / insights and openly received / provided feedback with other participants.    Demonstrated understanding of topics discussed through group discussion and participation and Identified / Expressed personal readiness to practice skills    Treatment Plan:  Patient has a current master individualized treatment plan.  See Epic treatment plan for more information.    Adrianna Montoya LGSW

## 2021-11-16 ENCOUNTER — HOSPITAL ENCOUNTER (OUTPATIENT)
Dept: BEHAVIORAL HEALTH | Facility: CLINIC | Age: 21
End: 2021-11-16
Attending: PSYCHIATRY & NEUROLOGY
Payer: COMMERCIAL

## 2021-11-16 PROCEDURE — 90853 GROUP PSYCHOTHERAPY: CPT | Mod: GT,95

## 2021-11-16 NOTE — GROUP NOTE
Psychotherapy Group Note    PATIENT'S NAME: Francesca Griffin  MRN:   [id number]  :   2000  ACCT. NUMBER: [id number]  DATE OF SERVICE: 21  START TIME:  3:00 PM  END TIME:  3:50 PM  FACILITATOR: Hattie Virk  TOPIC: MH EBP Group: Relationship Skills  [facility] Day Treatment  TRACK: 4b                                      Service Modality:  Video Visit     Telemedicine Visit: The patient's condition can be safely assessed and treated via synchronous audio and visual telemedicine encounter.      Reason for Telemedicine Visit: Services only offered telehealth    Originating Site (Patient Location): Patient's home    Distant Site (Provider Location): Provider Remote Setting- Home Office    Consent:  The patient/guardian has verbally consented to: the potential risks and benefits of telemedicine (video visit) versus in person care; bill my insurance or make self-payment for services provided; and responsibility for payment of non-covered services.     Patient would like the video invitation sent by:  My Chart    Mode of Communication:  Video Conference via Medical Zoom    As the provider I attest to compliance with applicable laws and regulations related to telemedicine.          NUMBER OF PARTICIPANTS: 6    Summary of Group / Topics Discussed:  Relationship Skills: Basic Communication: DAWIT SKILL. Patients were provided with a general overview of interpersonal communication skills and information about how communication skills impact symptoms and functioning. The goal of this topic is to help patients identify communication issues and gain skills to work towards healthier interpersonal communication. Patients reviewed their current awareness of communication issues and how communication skills impact relationships and functioning.      Patient Session Goals / Objectives:    Identified and discussed patients individual challenges with basic communication    Presented and  practiced effective communication skills in session    Assisted patients in implementing more effective communication skills in their relationships      Patient Participation / Response:  Fully participated with the group by sharing personal reflections / insights and openly received / provided feedback with other participants.    Demonstrated understanding of topics discussed through group discussion and participation, Demonstrated understanding of relationship skills and communication skills and Identified / Expressed personal readiness to incorporate effective communication skills    Treatment Plan:  Patient has a current master individualized treatment plan.  See Epic treatment plan for more information.    Hattie Virk

## 2021-11-16 NOTE — GROUP NOTE
Process Group Note    PATIENT'S NAME: Francesca Griffin  MRN:   [id number]  :   2000  ACCT. NUMBER: [id number]  DATE OF SERVICE: 21  START TIME:  1:00 PM  END TIME:  1:50 PM  FACILITATOR: Adrianna Montoya LGSW; [facility]vin [facility]  TOPIC:  Process Group    Diagnoses:   Principal DSM5 Diagnoses  (Sustained by DSM5 Criteria Listed Above):   296.33 (F33.2) Major Depressive Disorder, Recurrent Episode, Severe _ and With mixed features.  4. Other Diagnoses that is relevant to services:   300.01 (F41.0) Panic Disorder  300.00 (F41.9) Unspecified Anxiety Disorder.      [facility] Day Treatment  TRACK: 4B    NUMBER OF PARTICIPANTS: 7                                      Service Modality:  Video Visit     Telemedicine Visit: The patient's condition can be safely assessed and treated via synchronous audio and visual telemedicine encounter.      Reason for Telemedicine Visit: Services only offered telehealth    Originating Site (Patient Location): Patient's home    Distant Site (Provider Location): Provider Remote Setting- Home Office    Consent:  The patient/guardian has verbally consented to: the potential risks and benefits of telemedicine (video visit) versus in person care; bill my insurance or make self-payment for services provided; and responsibility for payment of non-covered services.     Patient would like the video invitation sent by:  My Chart    Mode of Communication:  Video Conference via Medical Zoom    As the provider I attest to compliance with applicable laws and regulations related to telemedicine.                Data:    Session content: At the start of this group, patients were invited to check in by identifying themselves, describing their current emotional status, and identifying issues to address in this group.   Area(s) of treatment focus addressed in this session included Symptom Management, Personal Safety and Community Resources/Discharge Planning.    Client  "reported doing \"pretty well\" today.  They reported feeling guilty and scattered as a result of being late to group due to making food for themselves.  Writer, co-facilitator and peers expressed understanding and empathy.  Francesca reflected on feeling distressed after losing the earrings on Friday night with a subsequent meltdown, with the feelings of relief of finding the earrings.  They reported skills they could use to manage future distress.  Client denied suicidal ideation.  They denied panic attacks.  They reported feeling proud for cleaning their room yesterday and stretching.     Therapeutic Interventions/Treatment Strategies:  Psychotherapist offered support, feedback and validation and reinforced use of skills. Treatment modalities used include Cognitive Behavioral Therapy. Interventions include Coping Skills: Discussed use of self-soothe skills to decrease distress in the body and Discussed how the use of intentional \"in the moment\" actions can help reduce distress.    Assessment:    Patient response:   Patient responded to session by accepting feedback, giving feedback and listening    Possible barriers to participation / learning include: and no barriers identified    Health Issues:   None reported       Substance Use Review:   Substance Use: No active concerns identified.    Mental Status/Behavioral Observations  Appearance:   Appropriate   Eye Contact:   Good   Psychomotor Behavior: Normal   Attitude:   Cooperative  Interested Pleasant  Orientation:   All  Speech   Rate / Production: Normal    Volume:  Normal   Mood:    Anxious  Depressed   Affect:    Appropriate   Thought Content:   Clear and Safety denies any current safety concerns including suicidal ideation, self-harm, and homicidal ideation  Thought Form:  Coherent  Logical     Insight:    Good     Plan:     Safety Plan: No current safety concerns identified.  Recommended that patient call 911 or go to the local ED should there be a change in any " of these risk factors.     Barriers to treatment: None identified    Patient Contracts (see media tab):  None    Substance Use: Not addressed in session     Continue or Discharge: Patient will continue in Adult Day Treatment (ADT)  as planned. Patient is likely to benefit from learning and using skills as they work toward the goals identified in their treatment plan.      MANNY Farfan  November 16, 2021

## 2021-11-16 NOTE — GROUP NOTE
Psychotherapy Group Note    PATIENT'S NAME: Francesca Griffin  MRN:   [id number]  :   2000  ACCT. NUMBER: [id number]  DATE OF SERVICE: 21  START TIME:  2:00 PM  END TIME:  2:50 PM  FACILITATOR: Hattie Virk  TOPIC: MH EBP Group: Relationship Skills  [facility] Adult Mental Health Day Treatment  TRACK: 4b                                      Service Modality:  Video Visit     Telemedicine Visit: The patient's condition can be safely assessed and treated via synchronous audio and visual telemedicine encounter.      Reason for Telemedicine Visit: Services only offered telehealth    Originating Site (Patient Location): Patient's home    Distant Site (Provider Location): Provider Remote Setting- Home Office    Consent:  The patient/guardian has verbally consented to: the potential risks and benefits of telemedicine (video visit) versus in person care; bill my insurance or make self-payment for services provided; and responsibility for payment of non-covered services.     Patient would like the video invitation sent by:  My Chart    Mode of Communication:  Video Conference via Medical Zoom    As the provider I attest to compliance with applicable laws and regulations related to telemedicine.         NUMBER OF PARTICIPANTS: 8    Summary of Group / Topics Discussed:  Relationship Skills: Assertive Communication: Patients were provided with a general overview of assertive communication skills and how practicing assertive communication skills will assist patients in developing healthier and more effective relationships. Patients reviewed their current awareness on ability to practice assertive communication, ways to increase assertive communication, and identified/problem solved barriers to assertive communication.     Patient Session Goals / Objectives:    Identified and discussed patient individual challenges with communication    Presented and practiced effective communication skills in  session    Assisted patients in implementing more effective communication skills in their relationships      Patient Participation / Response:  Fully participated with the group by sharing personal reflections / insights and openly received / provided feedback with other participants.    Demonstrated understanding of topics discussed through group discussion and participation, Demonstrated understanding of relationship skills and communication skills and Identified / Expressed personal readiness to incorporate effective communication skills    Treatment Plan:  Patient has a current master individualized treatment plan.  See Epic treatment plan for more information.    Hattie Virk

## 2021-11-18 ENCOUNTER — HOSPITAL ENCOUNTER (OUTPATIENT)
Dept: BEHAVIORAL HEALTH | Facility: CLINIC | Age: 21
End: 2021-11-18
Attending: PSYCHIATRY & NEUROLOGY
Payer: COMMERCIAL

## 2021-11-18 PROCEDURE — 90853 GROUP PSYCHOTHERAPY: CPT | Mod: GT,95

## 2021-11-18 NOTE — PROGRESS NOTES
Acknowledgement of Current Treatment Plan       I have reviewed my treatment plan with my therapist / counselor on 11/18/21.   I agree with the plan as it is written in the electronic health record. (4B)    Name:      Signature:  Francesca Altamiranosherrievazquez Unable to sign due to COVID 19 pandemic.     Kashif Ford MD  Psychiatrist/Medical Director Kashif Ford MD on 11/18/2021 at 3:22 PM   Perla Garvin [facility]  Psychotherapist FRANKIE Villarreal, [facility]

## 2021-11-18 NOTE — GROUP NOTE
Psychoeducation Group Note    PATIENT'S NAME: Francesca Griffin  MRN:   [id number]  :   2000  ACCT. NUMBER: [id number]  DATE OF SERVICE: 21  START TIME:  1:00 PM  END TIME:  1:50 PM  FACILITATOR: Hattie Virk  TOPIC: MH RN Group: Mental Health Maintenance  [facility] Adult Mental Health Day Treatment  TRACK: 4B                                      Service Modality:  Video Visit     Telemedicine Visit: The patient's condition can be safely assessed and treated via synchronous audio and visual telemedicine encounter.      Reason for Telemedicine Visit:  covid 19     Originating Site (Patient Location): Patient's home    Distant Site (Provider Location): Provider Remote Setting- Home Office    Consent:  The patient/guardian has verbally consented to: the potential risks and benefits of telemedicine (video visit) versus in person care; bill my insurance or make self-payment for services provided; and responsibility for payment of non-covered services.     Patient would like the video invitation sent by:  My Chart    Mode of Communication:  Video Conference via Medical Zoom    As the provider I attest to compliance with applicable laws and regulations related to telemedicine.          NUMBER OF PARTICIPANTS: 8    Summary of Group / Topics Discussed:  Mental Health Maintenance:  Vulnerability: In this group, the concept of vulnerability was explored through the viewing, discussion, and self-reflection of the Willy Ortega Talk Titled,  The Power of Vulnerability.      Patient Session Goals / Objectives:  ? Defined and described definition of vulnerability   ? Identified 2 or more ways of practicing authenticity         Patient Participation / Response:  Fully participated with the group by sharing personal reflections / insights and openly received / provided feedback with other participants.    Demonstrated understanding of topics discussed through group discussion and participation, Identified /  Expressed personal readiness to practice skills and Verbalized understanding of mental health maintenance topic    Treatment Plan:  Patient has a current master individualized treatment plan.  See Epic treatment plan for more information.    Hattie Virk

## 2021-11-18 NOTE — GROUP NOTE
Psychotherapy Group Note    PATIENT'S NAME: Francesca Griffin  MRN:   [id number]  :   2000  ACCT. NUMBER: [id number]  DATE OF SERVICE: 21  START TIME:  3:00 PM  END TIME:  3:50 PM  FACILITATOR: Hattie Virk  TOPIC: MH EBP Group: [facility] Adult Mental Health Day Treatment  TRACK: 4B                                      Service Modality:  Video Visit     Telemedicine Visit: The patient's condition can be safely assessed and treated via synchronous audio and visual telemedicine encounter.      Reason for Telemedicine Visit:  covid 19     Originating Site (Patient Location): Patient's home    Distant Site (Provider Location): Provider Remote Setting- Home Office    Consent:  The patient/guardian has verbally consented to: the potential risks and benefits of telemedicine (video visit) versus in person care; bill my insurance or make self-payment for services provided; and responsibility for payment of non-covered services.     Patient would like the video invitation sent by:  My Chart    Mode of Communication:  Video Conference via Medical Zoom    As the provider I attest to compliance with applicable laws and regulations related to telemedicine.         NUMBER OF PARTICIPANTS: 7    Summary of Group / Topics Discussed:  Mindfulness: Mindfulness Skills: Patients received information on the main components of mindfulness. Patients participated in discussion on how to practice observing, describing, and participating in internal and external environments. Relevance of mindfulness skills to overall mental and physical health was explored.  Patients explored and discussed in group their current awareness and knowledge of mindfulness skills as well as barriers to applying skills.    Patient Session Goals / Objectives:    Demonstrated and verbalized understanding of key mindfulness concepts    Identified when/how to use mindfulness skills    Resolved barriers to practicing mindfulness  skills    Identified plan to use mindfulness skills in daily life       Patient Participation / Response:  Fully participated with the group by sharing personal reflections / insights and openly received / provided feedback with other participants.    Demonstrated understanding of topics discussed through group discussion and participation, Demonstrated understanding of mindfulness skills and benefits of practice and Identified / Expressed personal readiness to practice mindfulness skills    Treatment Plan:  Patient has a current master individualized treatment plan.  See Epic treatment plan for more information.    Hattie Virk

## 2021-11-18 NOTE — GROUP NOTE
Process Group Note    PATIENT'S NAME: Francesca Griffin  MRN:   [id number]  :   2000  ACCT. NUMBER: [id number]  DATE OF SERVICE: 21  START TIME:  2:00 PM  END TIME:  2:50 PM  FACILITATOR: Adrianna Montoya LGSW; [facility]  TOPIC:  Process Group    Diagnoses:   Principal DSM5 Diagnoses  (Sustained by DSM5 Criteria Listed Above):   296.33 (F33.2) Major Depressive Disorder, Recurrent Episode, Severe _ and With mixed features.  4. Other Diagnoses that is relevant to services:   300.01 (F41.0) Panic Disorder  300.00 (F41.9) Unspecified Anxiety Disorder.      [facility] Treatment  TRACK: 4B    NUMBER OF PARTICIPANTS:                                       Service Modality:  Video Visit     Telemedicine Visit: The patient's condition can be safely assessed and treated via synchronous audio and visual telemedicine encounter.      Reason for Telemedicine Visit: Services only offered telehealth    Originating Site (Patient Location): Patient's home    Distant Site (Provider Location): Provider Remote Setting- Home Office    Consent:  The patient/guardian has verbally consented to: the potential risks and benefits of telemedicine (video visit) versus in person care; bill my insurance or make self-payment for services provided; and responsibility for payment of non-covered services.     Patient would like the video invitation sent by:  My Chart    Mode of Communication:  Video Conference via Medical Zoom    As the provider I attest to compliance with applicable laws and regulations related to telemedicine.                Data:    Session content: At the start of this group, patients were invited to check in by identifying themselves, describing their current emotional status, and identifying issues to address in this group.   Area(s) of treatment focus addressed in this session included Symptom Management, Personal Safety and Community Resources/Discharge Planning.    Client  "reported \"not doing awesome\" after Tuesday night when she realized a roommate was upset about dirty dishes left in the kitchen.  Client explained that after group on Tuesday, they went to the kitchen to discover a \"spotless\" kitchen.  Client explained that their roommate then confronted client about the mess in the kitchen with them both walking away shortly after.  Client reported having a \"deep emotional response\" where they were angry enough to want to \"throw a glass\".   Client went on a walk and used rocking back and forth when they returned to their room to self soothe.  Client also reported \"punching\" self as a way to calm down.  Client reported that they \"could not stop crying\".   Client acknowledged their response as disproportionate to the stressor.  Writer validated client's feelings.  Client reported going back to the kitchen later that night where them and their roommate had a \"blow up that turned into a conversation\".  Client became tearful when reflecting on the conversation and stated that their ADHD and \"gut wrenching emotions\" felt out of control.  Client wondered if they had taken too much blame for the situation.  Writer acknowledged that both parties could take accountability and ways to effectively communicate to their roommate.  Client reported having more \"severe suicidal ideation\" yesterday.  Client denied any current suicidal ideation, plan or intent.      Therapeutic Interventions/Treatment Strategies:  Psychotherapist offered support, feedback and validation and reinforced use of skills. Treatment modalities used include Cognitive Behavioral Therapy. Interventions include Coping Skills: Discussed use of self-soothe skills to decrease distress in the body and Relationship Skills: Assisted patients in implementing more effective communication skills in their relationships.    Assessment:    Patient response:   Patient responded to session by accepting feedback, giving feedback and " listening    Possible barriers to participation / learning include: and no barriers identified    Health Issues:   None reported       Substance Use Review:   Substance Use: No active concerns identified.    Mental Status/Behavioral Observations  Appearance:   Appropriate   Eye Contact:   Good   Psychomotor Behavior: Normal   Attitude:   Cooperative  Interested Pleasant  Orientation:   All  Speech   Rate / Production: Normal    Volume:  Normal   Mood:    Anxious  Depressed   Affect:    Tearful  Thought Content:   Safety denies any current safety concerns including suicidal ideation, self-harm, and homicidal ideation  Thought Form:  Coherent  Logical     Insight:    Good     Plan:     Safety Plan: No current safety concerns identified.  Recommended that patient call 911 or go to the local ED should there be a change in any of these risk factors.     Barriers to treatment: None identified    Patient Contracts (see media tab):  None    Substance Use: Not addressed in session     Continue or Discharge: Patient will continue in Adult Day Treatment (ADT)  as planned. Patient is likely to benefit from learning and using skills as they work toward the goals identified in their treatment plan.      MANYN Farfan  November 18, 2021

## 2021-11-22 ENCOUNTER — HOSPITAL ENCOUNTER (OUTPATIENT)
Dept: BEHAVIORAL HEALTH | Facility: CLINIC | Age: 21
End: 2021-11-22
Attending: PSYCHIATRY & NEUROLOGY
Payer: COMMERCIAL

## 2021-11-22 ENCOUNTER — TELEPHONE (OUTPATIENT)
Dept: BEHAVIORAL HEALTH | Facility: CLINIC | Age: 21
End: 2021-11-22
Payer: COMMERCIAL

## 2021-11-22 PROCEDURE — 90853 GROUP PSYCHOTHERAPY: CPT | Mod: GT,95 | Performed by: SOCIAL WORKER

## 2021-11-22 PROCEDURE — 90853 GROUP PSYCHOTHERAPY: CPT | Mod: GT,95

## 2021-11-22 NOTE — GROUP NOTE
Psychoeducation Group Note    PATIENT'S NAME: Francesca Griffin  MRN:   [id number]  :   2000  ACCT. NUMBER: [id number]  DATE OF SERVICE: 21  START TIME:  3:00 PM  END TIME:  3:50 PM  FACILITATOR: Adrianna Montoya LGSW  TOPIC: MANUEL RN Group: Health Maintenance  [facility] Adult Mental Health Day Treatment  TRACK: 4B    NUMBER OF PARTICIPANTS: 8    Summary of Group / Topics Discussed:  Health Maintenance: Goal Setting: Meaningful goals can bring a sense of direction and purpose in life.  They also highlight our most important values. Patients were assisted by instructor to identify short term goals to promote their mental health recovery and improve overall health and wellness. Patients were educated on SMART goal setting framework as a strategy to increase outcomes and promote success.    Patient Session Goals / Objectives:  ? Explained the key concepts of SMART goal setting framework  ? Identified three goals successfully using SMART goal setting framework  ? Reviewed concept of balance in wellness as it pertains to goal setting                                          Service Modality:  Video Visit     Telemedicine Visit: The patient's condition can be safely assessed and treated via synchronous audio and visual telemedicine encounter.      Reason for Telemedicine Visit: Services only offered telehealth    Originating Site (Patient Location): Patient's home    Distant Site (Provider Location): Provider Remote Setting- Home Office    Consent:  The patient/guardian has verbally consented to: the potential risks and benefits of telemedicine (video visit) versus in person care; bill my insurance or make self-payment for services provided; and responsibility for payment of non-covered services.     Patient would like the video invitation sent by:  My Chart    Mode of Communication:  Video Conference via Medical Zoom    As the provider I attest to compliance with applicable laws and regulations related to  telemedicine.            Patient Participation / Response:  Fully participated with the group by sharing personal reflections / insights and openly received / provided feedback with other participants.    Demonstrated understanding of topics discussed through group discussion and participation and Identified / Expressed personal readiness to practice skills    Treatment Plan:  Patient has a current master individualized treatment plan.  See Epic treatment plan for more information.    Adrianna Montoya LGSW

## 2021-11-22 NOTE — GROUP NOTE
Psychotherapy Group Note    PATIENT'S NAME: Francesca Griffin  MRN:   [id number]  :   2000  ACCT. NUMBER: [id number]  DATE OF SERVICE: 21  START TIME:  2:00 PM  END TIME:  2:50 PM  FACILITATOR: Perla Garvin LICSW  TOPIC: MH EBP Group: Symptom Awareness  [facility] Mental Health Day Treatment  TRACK: 4B    NUMBER OF PARTICIPANTS: 8    Summary of Group / Topics Discussed:  Symptom Awareness: Mood Disorders: Patients received a general overview of mood disorders including depressive disorders, anxiety disorders, and bipolar disorders and how it relates to their current symptoms. The purpose is to promote understanding of their diagnoses and how it impacts their functioning. Patients reviewed their current awareness of symptoms and diagnoses. Patients received information regarding diagnoses, etiology, cultural, and environmental factors as well as impact on functioning.     Patient Session Goals / Objectives:    Discussed patient individual symptoms and experiences    Reviewed diagnostic criteria and etiology of diagnoses                                     Service Modality:  Video Visit     Telemedicine Visit: The patient's condition can be safely assessed and treated via synchronous audio and visual telemedicine encounter.      Reason for Telemedicine Visit: Patient has requested telehealth visit    Originating Site (Patient Location): Patient's home    Distant Site (Provider Location): Provider Remote Setting- Home Office    Consent:  The patient/guardian has verbally consented to: the potential risks and benefits of telemedicine (video visit) versus in person care; bill my insurance or make self-payment for services provided; and responsibility for payment of non-covered services.     Patient would like the video invitation sent by:  My Chart    Mode of Communication:  Video Conference via Medical Zoom    As the provider I attest to compliance with applicable laws and regulations related  to telemedicine.           Patient Participation / Response:  Fully participated with the group by sharing personal reflections / insights and openly received / provided feedback with other participants.    Identified / Expressed personal readiness to increase awareness of symptoms and apply skills as necessary    Treatment Plan:  Patient has a current master individualized treatment plan.  See Epic treatment plan for more information.    Perla Garvin, LICSW

## 2021-11-22 NOTE — GROUP NOTE
Process Group Note    PATIENT'S NAME: Francesca Griffin  MRN:   [id number]  :   2000  ACCT. NUMBER: [id number]  DATE OF SERVICE: 21  START TIME:  1:00 PM  END TIME:  1:50 PM  FACILITATOR: Perla Garvin [facility]  TOPIC:  Process Group    Diagnoses:  296.33 (F33.2) Major Depressive Disorder, Recurrent Episode, Severe _ and With mixed features.  300.01 (F41.0) Panic Disorder  300.00 (F41.9) Unspecified Anxiety Disorder.      [facility] Mental Health Day Treatment  TRACK: 4B    NUMBER OF PARTICIPANTS: 7                                      Service Modality:  Video Visit     Telemedicine Visit: The patient's condition can be safely assessed and treated via synchronous audio and visual telemedicine encounter.      Reason for Telemedicine Visit: Services only offered telehealth    Originating Site (Patient Location): Patient's home    Distant Site (Provider Location): Provider Remote Setting- Home Office    Consent:  The patient/guardian has verbally consented to: the potential risks and benefits of telemedicine (video visit) versus in person care; bill my insurance or make self-payment for services provided; and responsibility for payment of non-covered services.     Patient would like the video invitation sent by:  My Chart    Mode of Communication:  Video Conference via Medical Zoom    As the provider I attest to compliance with applicable laws and regulations related to telemedicine.               Data:    Session content: At the start of this group, patients were invited to check in by identifying themselves, describing their current emotional status, and identifying issues to address in this group.   Area(s) of treatment focus addressed in this session included Symptom Management, Personal Safety and Community Resources/Discharge Planning.  Francesca took time to report oversleeping until group time today.   They shared that they are planning for holiday travel at the end of  and is  debating about their return date.  They shared that they have not talked with their parents about if they should return home for spring semester or stay in [place].   They shared that they would like to keep up there independent living skills and get a job on campus.  They reported less anxious mood and fewer panic attacks over the past few days.  Client denied suicidal ideation, intent and plan.     Therapeutic Interventions/Treatment Strategies:  Psychotherapist offered support, feedback and validation and reinforced use of skills. Treatment modalities used include Cognitive Behavioral Therapy. Interventions include Behavioral Activation: Explored how behaviors effect mood and interact with thoughts and feelings and Coping Skills: Assisted patient in identifying 1-2 healthy distraction skills to reduce overall distress.    Assessment:    Patient response:   Patient responded to session by listening, focusing on goals and being attentive    Possible barriers to participation / learning include: and no barriers identified    Health Issues:   None reported       Substance Use Review:   Substance Use: No active concerns identified.    Mental Status/Behavioral Observations  Appearance:   Appropriate   Eye Contact:   Good   Psychomotor Behavior: Normal   Attitude:   Cooperative  Friendly  Orientation:   All  Speech   Rate / Production: Normal    Volume:  Normal   Mood:    Anxious  Depressed   Affect:    Appropriate   Thought Content:   Clear  Thought Form:  Coherent  Logical     Insight:    Good     Plan:     Safety Plan: No current safety concerns identified.  Recommended that patient call 911 or go to the local ED should there be a change in any of these risk factors.     Barriers to treatment: None identified    Patient Contracts (see media tab):  None    Substance Use: Not addressed in session     Continue or Discharge: Patient will continue in Adult Day Treatment (ADT)  as planned. Patient is likely to benefit  from learning and using skills as they work toward the goals identified in their treatment plan.      Perla Garvin, [facility]  November 22, 2021

## 2021-11-23 ENCOUNTER — TELEPHONE (OUTPATIENT)
Dept: BEHAVIORAL HEALTH | Facility: CLINIC | Age: 21
End: 2021-11-23
Payer: COMMERCIAL

## 2021-11-23 ENCOUNTER — HOSPITAL ENCOUNTER (OUTPATIENT)
Dept: BEHAVIORAL HEALTH | Facility: CLINIC | Age: 21
End: 2021-11-23
Attending: PSYCHIATRY & NEUROLOGY
Payer: COMMERCIAL

## 2021-11-23 PROCEDURE — 90853 GROUP PSYCHOTHERAPY: CPT | Mod: GT,95 | Performed by: SOCIAL WORKER

## 2021-11-23 PROCEDURE — 90853 GROUP PSYCHOTHERAPY: CPT | Mod: GT,95

## 2021-11-23 NOTE — GROUP NOTE
Psychotherapy Group Note    PATIENT'S NAME: Francesca Griffin  MRN:   [id number]  :   2000  ACCT. NUMBER: [id number]  DATE OF SERVICE: 21  START TIME:  3:00 PM  END TIME:  3:50 PM  FACILITATOR: Adrianna Montoya LGSW  TOPIC:  EBP Group: Emotions Management  [facility] Treatment  TRACK: 4B    NUMBER OF PARTICIPANTS:     Summary of Group / Topics Discussed:  Emotions Management: Mood Tracking: Patients discussed and reviewed different resources to track one s mood, with a goal of identifying patterns and correlations between different factors and mood state.  Patients discussed ways to increase awareness of one s mood and how it may be impacted by environmental factors, diet, activity level, medication, etc. The group shared their experiences and thought processes for feedback.      Patient Session Goals / Objectives:    Increase awareness of daily mood patterns/changes    Report out identified factors that impact their mood    Demonstrate understanding of how to use different resources to track mood, and effectively use these to help manage symptoms      Patient Participation / Response:  Fully participated with the group by sharing personal reflections / insights and openly received / provided feedback with other participants.    Demonstrated understanding of topics discussed through group discussion and participation and Expressed understanding of the relevance / importance of emotions management skills at distressing times in life    Treatment Plan:  Patient has a current master individualized treatment plan.  See Epic treatment plan for more information.    MANNY Farfan

## 2021-11-23 NOTE — GROUP NOTE
"Process Group Note    PATIENT'S NAME: Francesca Griffin  MRN:   [id number]  :   2000  ACCT. NUMBER: [id number]  DATE OF SERVICE: 21  START TIME:  1:00 PM  END TIME:  1:50 PM  FACILITATOR: Adrianna Montoya LGSW  TOPIC:  Process Group    Diagnoses:   Principal DSM5 Diagnoses  (Sustained by DSM5 Criteria Listed Above):   296.33 (F33.2) Major Depressive Disorder, Recurrent Episode, Severe _ and With mixed features.  4. Other Diagnoses that is relevant to services:   300.01 (F41.0) Panic Disorder  300.00 (F41.9) Unspecified Anxiety Disorder.      [facility] Day Treatment  TRACK: 4B    NUMBER OF PARTICIPANTS: 8                                      Service Modality:  Video Visit     Telemedicine Visit: The patient's condition can be safely assessed and treated via synchronous audio and visual telemedicine encounter.      Reason for Telemedicine Visit: Services only offered telehealth    Originating Site (Patient Location): Patient's home    Distant Site (Provider Location): Provider Remote Setting- Home Office    Consent:  The patient/guardian has verbally consented to: the potential risks and benefits of telemedicine (video visit) versus in person care; bill my insurance or make self-payment for services provided; and responsibility for payment of non-covered services.     Patient would like the video invitation sent by:  My Chart    Mode of Communication:  Video Conference via Medical Zoom    As the provider I attest to compliance with applicable laws and regulations related to telemedicine.                Data:    Session content: At the start of this group, patients were invited to check in by identifying themselves, describing their current emotional status, and identifying issues to address in this group.   Area(s) of treatment focus addressed in this session included Symptom Management, Personal Safety and Community Resources/Discharge Planning.  Client reported feeling \"okay\" " "today, despite not sleeping the night prior.  Client explained that due to lack of sleep, they have felt \"loopy\" \"delirious\" and \"porous\".  Client wonders if their lack of sleep is due to their recent increase in Wellbutrin. Client meets with their psychiatrist tomorrow and will report their symptoms during the appointment.  Client also wondered if they were \"forcing\" themselves to avoid sleep and cited scrolling on their phone as a barrier to sleep. Client reports being proud of providing emotional support to their roommate by visiting them at work.  Client reports noticing a pattern of feeling urges to mirror their friend's distress and acknowledges that it causes them to feel emotionally drained.  Client did not report any suicidal ideation, plan or intent.      Therapeutic Interventions/Treatment Strategies:  Psychotherapist offered support, feedback and validation and reinforced use of skills. Treatment modalities used include Cognitive Behavioral Therapy. Interventions include Symptoms Management: Promoted understanding of their diagnoses and how it impacts their functioning and Relationship Skills: Encouraged development and maintenance  of healthy boundaries.    Assessment:    Patient response:   Patient responded to session by accepting feedback, giving feedback and listening    Possible barriers to participation / learning include: and no barriers identified    Health Issues:   None reported       Substance Use Review:   Substance Use: No active concerns identified.    Mental Status/Behavioral Observations  Appearance:   Appropriate   Eye Contact:   Good   Psychomotor Behavior: Normal   Attitude:   Cooperative  Interested Friendly Pleasant  Orientation:   All  Speech   Rate / Production: Normal    Volume:  Normal   Mood:    Anxious  Depressed   Affect:    Appropriate   Thought Content:   Clear and Safety denies any current safety concerns including suicidal ideation, self-harm, and homicidal " ideation  Thought Form:  Coherent  Logical     Insight:    Good     Plan:     Safety Plan: No current safety concerns identified.  Recommended that patient call 911 or go to the local ED should there be a change in any of these risk factors.     Barriers to treatment: None identified    Patient Contracts (see media tab):  None    Substance Use: Not addressed in session     Continue or Discharge: Patient will continue in Adult Day Treatment (ADT)  as planned. Patient is likely to benefit from learning and using skills as they work toward the goals identified in their treatment plan.      MANNY Farfan  November 23, 2021

## 2021-11-23 NOTE — GROUP NOTE
Psychotherapy Group Note    PATIENT'S NAME: Francesca Griffin  MRN:   [id number]  :   2000  ACCT. NUMBER: [id number]  DATE OF SERVICE: 21  START TIME:  2:00 PM  END TIME:  2:50 PM  FACILITATOR: Perla Garvin LICSW  TOPIC: MH EBP Group: Self-Awareness  [facility] Mental Health Day Treatment  TRACK: 4B    NUMBER OF PARTICIPANTS: 6    Summary of Group / Topics Discussed:  Self-Awareness: Values: Patients identified personal values by examining development of their current values and how their values influence their daily functioning and life choices. Patients explored the impact of their values on their thoughts, feelings, and actions. Patients discussed definition of personal values and how they develop and change over time. The goal is to help patients reconcile value conflicts and achieve balance and flexibility to improve mood and daily functioning.     Patient Session Goals / Objectives:    Examined development of values and impact of values on functioning    Identified and prioritized important values related to current well-being     Identified strategies to change or enhance values to positively impact symptoms    Assisted patients to find ways to adapt functioning to better fit their values                                    Service Modality:  Video Visit     Telemedicine Visit: The patient's condition can be safely assessed and treated via synchronous audio and visual telemedicine encounter.      Reason for Telemedicine Visit: Services only offered telehealth    Originating Site (Patient Location): Patient's home    Distant Site (Provider Location): Provider Remote Setting- Home Office    Consent:  The patient/guardian has verbally consented to: the potential risks and benefits of telemedicine (video visit) versus in person care; bill my insurance or make self-payment for services provided; and responsibility for payment of non-covered services.     Patient would like the video  invitation sent by:  My Chart    Mode of Communication:  Video Conference via Medical Zoom    As the provider I attest to compliance with applicable laws and regulations related to telemedicine.           Patient Participation / Response:  Fully participated with the group by sharing personal reflections / insights and openly received / provided feedback with other participants.    Identified / Expressed readiness to act intentionally, increase self-compassion, promote personal growth    Treatment Plan:  Patient has a current master individualized treatment plan.  See Epic treatment plan for more information.    Perla Garvin, CARLOSSW

## 2021-11-24 NOTE — TELEPHONE ENCOUNTER
Email sent by writer on Tuesday 11/23/2021 at 4:20pm    Trace Ma,    I forgot to follow up you in group but I spoke with Perla about psychological assessment resources.  We would suggest that you call the provider that assessed for ADHD to see if you were also assessed for Autism, BPD or other diagnoses that you were curious about.   If not, it would likely be easiest to schedule with that same provider to complete the remaining tests required.     If you did want neuropsychological testing, [facility] does offer it.  You would start by calling the Outpatient Scheduling line at [phone].  You can also request an appointment using this link: [email or web address]    Let Perla or I know if you have any questions.    All the best,  Adrianna Montoya  (she/her), MS, [facility]   Mental Health Outpatient Programs  Licensed Psychotherapist  Phone: [phone]    Fax: [phone]  [email or web address]   Employed by [facility]  Schedule: Monday - Thursday 8:00am -4:30pm

## 2021-11-24 NOTE — TELEPHONE ENCOUNTER
Email sent by writer on Monday, 11/22/2021 at 4:06pm.    Trace Ma,    Here s a summary of what we talked about this afternoon: I connected with [facility] s Comprehensive Gender Care program which specializes in treating issues of gender dysphoria.  Their usual protocol is to have patients reach out to the [place] [facility], but the waitlist is about 1 year long. In the meantime, the intake/referral coordinator (Ana) is sending out a list of specialized providers in the [facility].     Attached is the list that Ana sent me.  Ana mentioned being happy to provide guidance on resources.  Their contact information is below:    Ana Baires  Pronouns: they/them/their  Intake and Referral   [facility]  Comprehensive Gender Care  [address] [place] [id number]  [email or web address]  Office: [phone]  Fax: [phone]  Employed by [facility]      All the best,    Adrianna Montoya  (she/her), MS, [facility]   Mental Health Outpatient Programs  Licensed Psychotherapist  Phone: [phone]    Fax: [phone]  [email or web address]   Employed by [employer] [facility]  Schedule: Monday - Thursday 8:00am -4:30pm

## 2021-11-29 ENCOUNTER — HOSPITAL ENCOUNTER (OUTPATIENT)
Dept: BEHAVIORAL HEALTH | Facility: CLINIC | Age: 21
End: 2021-11-29
Attending: PSYCHIATRY & NEUROLOGY
Payer: COMMERCIAL

## 2021-11-29 ENCOUNTER — TELEPHONE (OUTPATIENT)
Dept: BEHAVIORAL HEALTH | Facility: CLINIC | Age: 21
End: 2021-11-29
Payer: COMMERCIAL

## 2021-11-29 PROCEDURE — 90853 GROUP PSYCHOTHERAPY: CPT | Mod: GT,95

## 2021-11-29 PROCEDURE — 90853 GROUP PSYCHOTHERAPY: CPT | Mod: GT,95 | Performed by: SOCIAL WORKER

## 2021-11-29 NOTE — TELEPHONE ENCOUNTER
Writer received the following email from client on 11/29/2021 at 3:18pm:    Frank Rodas, I had the wrong email address for you  ---------- Forwarded message ---------  From: Francesca Griffin <[email or web address]>  Date: Mon, Nov 29, 2021 at 3:16 PM  Subject: Re: Medical Supplement Form  To: <[email or web address]>, <[email or web address]>, <[email or web address]>    attaching the form would be helpful oops...    [email or web address]     thanks again!       On Mon, Nov 29, 2021 at 3:14 PM Francesca Griffin <[email or web address]> wrote:  Trace Hood,    I am needing a medical supplement form for the academic policy petition and tuition reimbursement form I am submitting to the [employer]. Could either you all fill it out and get it back to me or send it to Dr. Ford? I would like to get these petitions in as soon as possible.    Thanks,  Francesca      Writer responded securely to client's email at 4:52pm:    Trace Ma,    I spoke with Dr. Ford s team who will get this form completed as soon as possible.  Due to high staffing demands, I am unable to give you a clear time on when this will get completed. However, your request is in process and Dr. Ford s team knows you need this form to be completed ASAP.    All the best,    Adrianna Montoya  (she/her), MSW, [facility]   Mental Health Outpatient Programs  Licensed Psychotherapist  Phone: [phone]    Fax: [phone]  [email or web address]   Employed by [employer]  Schedule: Monday - Thursday 8:00am -4:30pm

## 2021-11-29 NOTE — GROUP NOTE
Psychotherapy Group Note    PATIENT'S NAME: Francesca Griffin  MRN:   [id number]  :   2000  ACCT. NUMBER: [id number]  DATE OF SERVICE: 21  START TIME:  3:00 PM  END TIME:  3:50 PM  FACILITATOR: Adrianna Montoya LGSW  TOPIC:  EBP Group: Self-Awareness  [facility] Adult Mental Health Day Treatment  TRACK: 4B    NUMBER OF PARTICIPANTS: 7    Summary of Group / Topics Discussed:  Self-Awareness: Personal Strengths: Topic focused on assisting patients in identifying personal strengths and how they relate to the management of mental health symptoms. Patients discussed the benefits of acknowledging their personal strengths and their impact on mood improvement, mindfulness, and perspective. Patients worked to increase time spent on recognition and appreciation of what is positive and working in their lives. The goal is to reduce rumination and negative thinking resulting in increased mindfulness and resilience. Patients will work to put skills into practice and problem-solve barriers.     Patient Session Goals / Objectives:    Identified personal strengths    Identified barriers to recognition of personal strengths    Verbalized understanding of strategies to increase use of their strengths in management of daily symptoms      Patient Participation / Response:  Fully participated with the group by sharing personal reflections / insights and openly received / provided feedback with other participants.    Demonstrated understanding of topics discussed through group discussion and participation and Demonstrated understanding of values, strengths, and challenges to learn about themselves    Treatment Plan:  Patient has a current master individualized treatment plan.  See Epic treatment plan for more information.    MANNY Farfan

## 2021-11-29 NOTE — GROUP NOTE
Process Group Note    PATIENT'S NAME: Francesca Griffin  MRN:   [id number]  :   2000  ACCT. NUMBER: [id number]  DATE OF SERVICE: 21  START TIME:  1:00 PM  END TIME:  1:50 PM  FACILITATOR: Perla Garvin [facility]  TOPIC:  Process Group    Diagnoses:  296.33 (F33.2) Major Depressive Disorder, Recurrent Episode, Severe _ and With mixed features.  300.01 (F41.0) Panic Disorder  300.00 (F41.9) Unspecified Anxiety Disorder.      [facility] Mental Health Day Treatment  TRACK: 4B    NUMBER OF PARTICIPANTS: 8                                      Service Modality:  Video Visit     Telemedicine Visit: The patient's condition can be safely assessed and treated via synchronous audio and visual telemedicine encounter.      Reason for Telemedicine Visit: Services only offered telehealth    Originating Site (Patient Location): Patient's home    Distant Site (Provider Location): Provider Remote Setting- Home Office    Consent:  The patient/guardian has verbally consented to: the potential risks and benefits of telemedicine (video visit) versus in person care; bill my insurance or make self-payment for services provided; and responsibility for payment of non-covered services.     Patient would like the video invitation sent by:  My Chart    Mode of Communication:  Video Conference via Medical Zoom    As the provider I attest to compliance with applicable laws and regulations related to telemedicine.               Data:    Session content: At the start of this group, patients were invited to check in by identifying themselves, describing their current emotional status, and identifying issues to address in this group.   Area(s) of treatment focus addressed in this session included Symptom Management, Personal Safety and Community Resources/Discharge Planning.  Francesca took time to report doing pretty well. They shared that they walked to the clinic to  their medications and complete a lab.  They shared that  they were concerned abut how to taper the Wellbutrin over the holiday weeken without getting the medication filled, but they were able to figure out a plan.  The stated that they are looking at options for work and living situation after withdrawing from college.   They shared that they are not sure how to figure out direction for the next step in their life.  Peers gave feedback and suggested looking at options for larger needs such as being calm, the freedom to be spontaneous, and so on.  Client denied suicidal ideation, intent and plan.     Therapeutic Interventions/Treatment Strategies:  Psychotherapist offered support, feedback and validation and reinforced use of skills. Treatment modalities used include Cognitive Behavioral Therapy. Interventions include Cognitive Restructuring:  Assisted patient in formulating new neutral/positive alternatives to challenge less helpful / ineffective thoughts and Assisted patient in identifying new neutral/positive core beliefs.    Assessment:    Patient response:   Patient responded to session by giving feedback, listening, focusing on goals and being attentive    Possible barriers to participation / learning include: and no barriers identified    Health Issues:   None reported       Substance Use Review:   Substance Use: No active concerns identified.    Mental Status/Behavioral Observations  Appearance:   Appropriate   Eye Contact:   Good   Psychomotor Behavior: Normal   Attitude:   Cooperative  Friendly  Orientation:   All  Speech   Rate / Production: Normal    Volume:  Normal   Mood:    Anxious  Depressed  Ambivalence  Affect:    Appropriate   Thought Content:   Clear  Thought Form:  Coherent  Logical     Insight:    Good     Plan:     Safety Plan: No current safety concerns identified.  Recommended that patient call 911 or go to the local ED should there be a change in any of these risk factors.     Barriers to treatment: None identified    Patient Contracts (see media  tab):  None    Substance Use: Not addressed in session     Continue or Discharge: Patient will continue in Adult Day Treatment (ADT)  as planned. Patient is likely to benefit from learning and using skills as they work toward the goals identified in their treatment plan.      Perla Garvin, [facility]  November 29, 2021

## 2021-11-29 NOTE — GROUP NOTE
Psychotherapy Group Note    PATIENT'S NAME: Francesca Griffin  MRN:   [id number]  :   2000  ACCT. NUMBER: [id number]  DATE OF SERVICE: 21  START TIME:  2:00 PM  END TIME:  2:50 PM  [facility]ACILIT[facility]vin [facility]  TOPIC: MH EBP Group: Emotions Management  [facility] Mental Health Day Treatment  TRACK: 4B    NUMBER OF PARTICIPANTS: 7    Summary of Group / Topics Discussed:  Emotions Management: Understanding Emotions: Patients discussed the purpose of emotions and function they serve in our lives.  Reviewed core emotions, why they happen (triggers), and how they occur. The group assisted one anothers' understanding that: emotional experiences are important; difficult emotions have a place in our lives; and the differences between various emotions.    Patient Session Goals / Objectives:    Demonstrate understanding of types various emotions    Identify and discuss specific emotions and when they occur; understand triggers    Discuss barriers to emotional regulation                                    Service Modality:  Video Visit     Telemedicine Visit: The patient's condition can be safely assessed and treated via synchronous audio and visual telemedicine encounter.      Reason for Telemedicine Visit: Services only offered telehealth    Originating Site (Patient Location): Patient's home    Distant Site (Provider Location): Provider Remote Setting- Home Office    Consent:  The patient/guardian has verbally consented to: the potential risks and benefits of telemedicine (video visit) versus in person care; bill my insurance or make self-payment for services provided; and responsibility for payment of non-covered services.     Patient would like the video invitation sent by:  My Chart    Mode of Communication:  Video Conference via Medical Zoom    As the provider I attest to compliance with applicable laws and regulations related to telemedicine.           Patient Participation /  Response:  Fully participated with the group by sharing personal reflections / insights and openly received / provided feedback with other participants.    Self-aware of experiences with difficult emotions, and strategies to employ to manage them    Treatment Plan:  Patient has a current master individualized treatment plan.  See Epic treatment plan for more information.    Perla Garvin, LICSW

## 2021-11-30 ENCOUNTER — TELEPHONE (OUTPATIENT)
Dept: BEHAVIORAL HEALTH | Facility: CLINIC | Age: 21
End: 2021-11-30
Payer: COMMERCIAL

## 2021-11-30 ENCOUNTER — HOSPITAL ENCOUNTER (OUTPATIENT)
Dept: BEHAVIORAL HEALTH | Facility: CLINIC | Age: 21
End: 2021-11-30
Attending: PSYCHIATRY & NEUROLOGY
Payer: COMMERCIAL

## 2021-11-30 PROCEDURE — 90853 GROUP PSYCHOTHERAPY: CPT | Mod: GT,95

## 2021-11-30 PROCEDURE — 90853 GROUP PSYCHOTHERAPY: CPT | Mod: GT,95 | Performed by: SOCIAL WORKER

## 2021-11-30 NOTE — TELEPHONE ENCOUNTER
Completed Medical Supp form 1:1 over the phone.  Copy given to Diego for approval. Approval obtained.  Emailed signed copy to pt.

## 2021-11-30 NOTE — GROUP NOTE
Psychotherapy Group Note    PATIENT'S NAME: Francesca Griffin  MRN:   [id number]  :   2000  ACCT. NUMBER: [id number]  DATE OF SERVICE: 21  START TIME:  3:00 PM  END TIME:  3:50 PM  FACILITATOR: Adrianna Montoya LGSW  TOPIC:  EBP Group: Self-Awareness  [facility] Mental Health Day Treatment  TRACK: 4B    NUMBER OF PARTICIPANTS: 8    Summary of Group / Topics Discussed:  Self-Awareness: Self-Compassion: Patients received overview of key concepts in developing self-compassion. Patients discussed mindfulness, self-kindness, and finding common humanity. Patients identified their current approach to problems in their lives and learned skills for increasing self-compassion. Patients identified ways they can put self-compassion skills into practice and problem solve barriers to application of skills.     Patient Session Goals / Objectives:    [place] components of self-compassion    Identify ways to practice self-compassion in daily life    Problem solve barriers to self-compassion practice      Patient Participation / Response:  Fully participated with the group by sharing personal reflections / insights and openly received / provided feedback with other participants.    Demonstrated understanding of topics discussed through group discussion and participation and Demonstrated understanding of values, strengths, and challenges to learn about themselves    Treatment Plan:  Patient has a current master individualized treatment plan.  See Epic treatment plan for more information.    MANNY Farfan

## 2021-11-30 NOTE — GROUP NOTE
Process Group Note    PATIENT'S NAME: Francesca Griffin  MRN:   [id number]  :   2000  ACCT. NUMBER: [id number]  DATE OF SERVICE: 21  START TIME:  1:00 PM  END TIME:  1:50 PM  FACILITATOR: Perla Garvin [facility]  TOPIC:  Process Group    Diagnoses:  296.33 (F33.2) Major Depressive Disorder, Recurrent Episode, Severe _ and With mixed features.  300.01 (F41.0) Panic Disorder  300.00 (F41.9) Unspecified Anxiety Disorder.      [facility] Mental Health Day Treatment  TRACK: 4B    NUMBER OF PARTICIPANTS: 7                                      Service Modality:  Video Visit     Telemedicine Visit: The patient's condition can be safely assessed and treated via synchronous audio and visual telemedicine encounter.      Reason for Telemedicine Visit: Services only offered telehealth    Originating Site (Patient Location): Patient's home    Distant Site (Provider Location): Provider Remote Setting- Home Office    Consent:  The patient/guardian has verbally consented to: the potential risks and benefits of telemedicine (video visit) versus in person care; bill my insurance or make self-payment for services provided; and responsibility for payment of non-covered services.     Patient would like the video invitation sent by:  My Chart    Mode of Communication:  Video Conference via Medical Zoom    As the provider I attest to compliance with applicable laws and regulations related to telemedicine.               Data:    Session content: At the start of this group, patients were invited to check in by identifying themselves, describing their current emotional status, and identifying issues to address in this group.   Area(s) of treatment focus addressed in this session included Symptom Management, Personal Safety and Community Resources/Discharge Planning.  Francesca reported thinking that they may have had a flashback while on a walk.  They stated that they went into a store which helped them ground in the  present.  Francesca also talked about having difficulty going into the shower and not knowing if it was gender dysphoria or trauma memories causing the difficulty.  Francesca reported increased anxious mood with some dissociating.   They shared that they were pleased that the form for the student medical withdrawal was in process. The stated that they started the Lithium and was continuing to use Lorazepam as needed.   The Wellbutrin dose is being tapered.  Client denied suicidal ideation, intent and plan.     Therapeutic Interventions/Treatment Strategies:  Psychotherapist offered support, feedback and validation and reinforced use of skills. Treatment modalities used include Cognitive Behavioral Therapy. Interventions include Cognitive Restructuring:  Assisted patient in formulating new neutral/positive alternatives to challenge less helpful / ineffective thoughts.    Assessment:    Patient response:   Patient responded to session by focusing on goals and being attentive    Possible barriers to participation / learning include: and no barriers identified    Health Issues:   None reported       Substance Use Review:   Substance Use: No active concerns identified.    Mental Status/Behavioral Observations  Appearance:   Appropriate   Eye Contact:   Good   Psychomotor Behavior: Normal   Attitude:   Cooperative  Friendly  Orientation:   All  Speech   Rate / Production: Normal    Volume:  Normal   Mood:    Anxious  Depressed   Affect:    Appropriate   Thought Content:   Clear  Thought Form:  Coherent  Logical     Insight:    Good     Plan:     Safety Plan: No current safety concerns identified.  Recommended that patient call 911 or go to the local ED should there be a change in any of these risk factors.     Barriers to treatment: None identified    Patient Contracts (see media tab):  None    Substance Use: Not addressed in session     Continue or Discharge: Patient will continue in Adult Day Treatment (ADT)  as planned.  Patient is likely to benefit from learning and using skills as they work toward the goals identified in their treatment plan.      Perla Garvin, [facility]  November 30, 2021

## 2021-11-30 NOTE — GROUP NOTE
Psychotherapy Group Note    PATIENT'S NAME: Francesca Griffin  MRN:   [id number]  :   2000  ACCT. NUMBER: [id number]  DATE OF SERVICE: 21  START TIME:  2:00 PM  END TIME:  2:50 PM  FACILITATOR: Perla Garvin [facility]  TOPIC: MH EBP Group: Emotions Management  [facility] Mental Health Day Treatment  TRACK: 4B    NUMBER OF PARTICIPANTS: 7    Summary of Group / Topics Discussed:  Emotions Management: Anger: Patients explored and shared personal experiences associated with feelings of anger.  Group explored how these feelings develop, what they mean to each individual, and how to increase acceptance and usefulness of these feelings.  Discussed anger as a  secondary  emotion and reviewed ways to manage anger and challenge associated cognitive distortions. Group members worked to contextualize these concepts and promote healing.     Patient Session Goals / Objectives:    Discuss and review definitions and personal views/experiences with anger    Explore how feelings of anger impact functioning    Understand and practice strategies to manage difficult emotions and move towards healing    Demonstrate understanding of the feelings of anger    Verbalize how these emotions have impacted their lives/functioning    Verbalize of knowledge gained and possible interventions to manage feelings                                    Service Modality:  Video Visit     Telemedicine Visit: The patient's condition can be safely assessed and treated via synchronous audio and visual telemedicine encounter.      Reason for Telemedicine Visit: Services only offered telehealth    Originating Site (Patient Location): Patient's home    Distant Site (Provider Location): Provider Remote Setting- Home Office    Consent:  The patient/guardian has verbally consented to: the potential risks and benefits of telemedicine (video visit) versus in person care; bill my insurance or make self-payment for services provided; and  responsibility for payment of non-covered services.     Patient would like the video invitation sent by:  My Chart    Mode of Communication:  Video Conference via Medical Zoom    As the provider I attest to compliance with applicable laws and regulations related to telemedicine.           Patient Participation / Response:  Fully participated with the group by sharing personal reflections / insights and openly received / provided feedback with other participants.    Identified barriers to applying emotions management strategies    Treatment Plan:  Patient has a current master individualized treatment plan.  See Epic treatment plan for more information.    Perla Garvin, [facility]

## 2021-12-02 ENCOUNTER — HOSPITAL ENCOUNTER (OUTPATIENT)
Dept: BEHAVIORAL HEALTH | Facility: CLINIC | Age: 21
End: 2021-12-02
Attending: PSYCHIATRY & NEUROLOGY
Payer: COMMERCIAL

## 2021-12-02 PROCEDURE — 90853 GROUP PSYCHOTHERAPY: CPT | Mod: GT,95

## 2021-12-02 PROCEDURE — 90853 GROUP PSYCHOTHERAPY: CPT | Mod: GT,95 | Performed by: SOCIAL WORKER

## 2021-12-02 NOTE — GROUP NOTE
Process Group Note    PATIENT'S NAME: Francesca Griffin  MRN:   [id number]  :   2000  ACCT. NUMBER: [id number]  DATE OF SERVICE: 21  START TIME:  1:00 PM  END TIME:  1:50 PM  FACILITATOR: Perla Garvin [facility]  TOPIC:  Process Group    Diagnoses:  296.33 (F33.2) Major Depressive Disorder, Recurrent Episode, Severe _ and With mixed features.  300.01 (F41.0) Panic Disorder  300.00 (F41.9) Unspecified Anxiety Disorder.      [facility] Mental Health Day Treatment  TRACK: 4B    NUMBER OF PARTICIPANTS: 8                                      Service Modality:  Video Visit     Telemedicine Visit: The patient's condition can be safely assessed and treated via synchronous audio and visual telemedicine encounter.      Reason for Telemedicine Visit: Services only offered telehealth    Originating Site (Patient Location): Patient's home    Distant Site (Provider Location): Provider Remote Setting- Home Office    Consent:  The patient/guardian has verbally consented to: the potential risks and benefits of telemedicine (video visit) versus in person care; bill my insurance or make self-payment for services provided; and responsibility for payment of non-covered services.     Patient would like the video invitation sent by:  My Chart    Mode of Communication:  Video Conference via Medical Zoom    As the provider I attest to compliance with applicable laws and regulations related to telemedicine.               Data:    Session content: At the start of this group, patients were invited to check in by identifying themselves, describing their current emotional status, and identifying issues to address in this group.   Area(s) of treatment focus addressed in this session included Symptom Management, Personal Safety and Community Resources/Discharge Planning.  Client shared their distress at not know whether they will be staying with family in [place] or returning to [place] after the winter break.    "Client identified pros/cons of these options and identified other alternatives.  Client reported difficulty tolerating being \"in between emotionally\".  Client requested feedback from peers who shared their path.  Client denied suicidal ideation, intent and plan.     Therapeutic Interventions/Treatment Strategies:  Psychotherapist offered support, feedback and validation and reinforced use of skills. Treatment modalities used include Cognitive Behavioral Therapy. Interventions include Coping Skills: Assisted patient in understanding the purpose of planning / creating / participating / sharing in positive experiences.    Assessment:    Patient response:   Patient responded to session by listening, focusing on goals and being attentive    Possible barriers to participation / learning include: and no barriers identified    Health Issues:   None reported       Substance Use Review:   Substance Use: No active concerns identified.    Mental Status/Behavioral Observations  Appearance:   Appropriate   Eye Contact:   Good   Psychomotor Behavior: Normal   Attitude:   Cooperative   Orientation:   All  Speech   Rate / Production: Normal    Volume:  Normal   Mood:    Anxious  Depressed   Affect:    Appropriate   Thought Content:   Clear  Thought Form:  Coherent  Logical     Insight:    Good     Plan:     Safety Plan: No current safety concerns identified.  Recommended that patient call 911 or go to the local ED should there be a change in any of these risk factors.     Barriers to treatment: None identified    Patient Contracts (see media tab):  None    Substance Use: Not addressed in session     Continue or Discharge: Patient will continue in Adult Day Treatment (ADT)  as planned. Patient is likely to benefit from learning and using skills as they work toward the goals identified in their treatment plan.      Perla Garvin, [facility]  December 2, 2021    "

## 2021-12-02 NOTE — GROUP NOTE
Psychotherapy Group Note    PATIENT'S NAME: Francesca Griffin  MRN:   [id number]  :   2000  ACCT. NUMBER: [id number]  DATE OF SERVICE: 21  START TIME:  2:00 PM  END TIME:  2:50 PM  FACILITATOR: Perla Garvin LICSW  TOPIC: MH EBP Group: Specialty Awareness  [facility] Adult Mental Health Day Treatment  TRACK: 4B    NUMBER OF PARTICIPANTS: 8    Summary of Group / Topics Discussed:  Specialty Topics: Hope: The topic of hope was presented in order to help patients better understand the symptoms of hopelessness and how to become more hopeful.  As several group members were experiencing low levels of hope writer added a progressive muscle relaxation to help clients return to the present moment.      Patient Session Goals / Objectives:    Discussed definition of hopelessness    Discussed how hopelessness impacts functioning    Set a plan to utilize skills to reduce hopelessness    Reviewed use of mindfulness to disconnect from negative self-talk that decreases level of hope.                                      Service Modality:  Video Visit     Telemedicine Visit: The patient's condition can be safely assessed and treated via synchronous audio and visual telemedicine encounter.      Reason for Telemedicine Visit: Services only offered telehealth    Originating Site (Patient Location): Patient's home    Distant Site (Provider Location): Provider Remote Setting- Home Office    Consent:  The patient/guardian has verbally consented to: the potential risks and benefits of telemedicine (video visit) versus in person care; bill my insurance or make self-payment for services provided; and responsibility for payment of non-covered services.     Patient would like the video invitation sent by:  My Chart    Mode of Communication:  Video Conference via Medical Zoom    As the provider I attest to compliance with applicable laws and regulations related to telemedicine.           Patient Participation /  Response:  Fully participated with the group by sharing personal reflections / insights and openly received / provided feedback with other participants.    Identified / Expressed readiness to act on skill suggestions discussed in topic    Treatment Plan:  Patient has a current master individualized treatment plan.  See Epic treatment plan for more information.    Perla Garvin, [facility]

## 2021-12-02 NOTE — GROUP NOTE
Psychoeducation Group Note    PATIENT'S NAME: Francesca Griffin  MRN:   [id number]  :   2000  ACCT. NUMBER: [id number]  DATE OF SERVICE: 21  START TIME:  3:00 PM  END TIME:  3:50 PM  FACILITATOR: Adrianna Montoya LGSW  TOPIC:  RN Group: Health Maintenance  [facility] Mental Health Day Treatment  TRACK: 4B    NUMBER OF PARTICIPANTS:     Summary of Group / Topics Discussed:  Health Maintenance: Weekend planning: Patients were given time to complete a weekend plan of what they will do to promote wellness and sobriety over the weekend when they do not have the structure of group. Patients were encouraged to review progress on their treatment goals and were challenged to identify ways to work toward meeting them. Patients identified and discussed foreseeable barriers to success over the weekend and then developed a plan to overcome them. Patients reviewed their distress coping skills and social support network and discussed this with the group.       Patient Session Goals / Objectives:    ?    Identified activities to engage in that promote balance in wellness  ?    Distinguished possible barriers to success over the weekend and created a plan to overcome them  ?    Listed distress coping skills and identified social support network to utilize if in crisis during the weekend        Patient Participation / Response:  Fully participated with the group by sharing personal reflections / insights and openly received / provided feedback with other participants.    Demonstrated understanding of topics discussed through group discussion and participation    Treatment Plan:  Patient has a current master individualized treatment plan.  See Epic treatment plan for more information.    MANNY Farfan

## 2021-12-06 ENCOUNTER — HOSPITAL ENCOUNTER (OUTPATIENT)
Dept: BEHAVIORAL HEALTH | Facility: CLINIC | Age: 21
End: 2021-12-06
Attending: PSYCHIATRY & NEUROLOGY
Payer: COMMERCIAL

## 2021-12-06 PROCEDURE — 90853 GROUP PSYCHOTHERAPY: CPT | Mod: GT,95 | Performed by: SOCIAL WORKER

## 2021-12-06 PROCEDURE — 90853 GROUP PSYCHOTHERAPY: CPT | Mod: GT,95

## 2021-12-06 NOTE — GROUP NOTE
"Process Group Note    PATIENT'S NAME: Francesca Griffin  MRN:   [id number]  :   2000  ACCT. NUMBER: [id number]  DATE OF SERVICE: 21  START TIME:  1:00 PM  END TIME:  1:50 PM  FACILITATOR: Perla Garvin [facility]  TOPIC:  Process Group    Diagnoses:  296.33 (F33.2) Major Depressive Disorder, Recurrent Episode, Severe _ and With mixed features.  300.01 (F41.0) Panic Disorder  300.00 (F41.9) Unspecified Anxiety Disorder.      [facility] Mental Health Day Treatment  TRACK: 4B    NUMBER OF PARTICIPANTS: 7                                    Service Modality:  Video Visit     Telemedicine Visit: The patient's condition can be safely assessed and treated via synchronous audio and visual telemedicine encounter.      Reason for Telemedicine Visit: Services only offered telehealth    Originating Site (Patient Location): Patient's home    Distant Site (Provider Location): Provider Remote Setting- Home Office    Consent:  The patient/guardian has verbally consented to: the potential risks and benefits of telemedicine (video visit) versus in person care; bill my insurance or make self-payment for services provided; and responsibility for payment of non-covered services.     Patient would like the video invitation sent by:  My Chart    Mode of Communication:  Video Conference via Medical Zoom    As the provider I attest to compliance with applicable laws and regulations related to telemedicine.               Data:    Session content: At the start of this group, patients were invited to check in by identifying themselves, describing their current emotional status, and identifying issues to address in this group.   Area(s) of treatment focus addressed in this session included Symptom Management, Personal Safety and Community Resources/Discharge Planning.  Francesca reported that they feel \"alright\" this afternoon after having a FaceTime video call then shared watching of a [employer] video with their sister this " "morning.   They shared that they were mad at their therapist after the therapist asked a direct question.  They stated that they asked the therapist to have less direct eye contact on Zoom, so they agreed to both color while talking.   They shared that they had a panic attack at bedtime.   They practiced oping skills of using ice water to change body temperature and watched a movie for distraction.   They shared that they were awake until 5:30 am this morning due to the panic attack.  Client denied suicidal ideation, intent and plan.     Therapeutic Interventions/Treatment Strategies:  Psychotherapist offered support, feedback and validation and reinforced use of skills. Treatment modalities used include Cognitive Behavioral Therapy. Interventions include Coping Skills: Discussed how the use of intentional \"in the moment\" actions can help reduce distress.    Assessment:    Patient response:   Patient responded to session by focusing on goals, being attentive and accepting support    Possible barriers to participation / learning include: and no barriers identified    Health Issues:   None reported       Substance Use Review:   Substance Use: No active concerns identified.    Mental Status/Behavioral Observations  Appearance:   Appropriate   Eye Contact:   Good   Psychomotor Behavior: Normal   Attitude:   Cooperative   Orientation:   All  Speech   Rate / Production: Normal    Volume:  Normal   Mood:    Anxious  Depressed   Affect:    Appropriate   Thought Content:   Clear  Thought Form:  Coherent  Logical     Insight:    Good     Plan:     Safety Plan: No current safety concerns identified.  Recommended that patient call 911 or go to the local ED should there be a change in any of these risk factors.     Barriers to treatment: None identified    Patient Contracts (see media tab):  None    Substance Use: Not addressed in session     Continue or Discharge: Patient will continue in Adult Day Treatment (ADT)  as planned. " Patient is likely to benefit from learning and using skills as they work toward the goals identified in their treatment plan.      Perla Garvin, [facility]  December 6, 2021

## 2021-12-06 NOTE — GROUP NOTE
Psychotherapy Group Note    PATIENT'S NAME: Francesca Grififn  MRN:   [id number]  :   2000  ACCT. NUMBER: [id number]  DATE OF SERVICE: 21  START TIME:  3:00 PM  END TIME:  3:50 PM  FACILITATOR: Adrianna Montoya LGSW  TOPIC:  EBP Group: Symptom Awareness  [facility] Mental Health Day Treatment  TRACK: 4B    NUMBER OF PARTICIPANTS:     Summary of Group / Topics Discussed:  Symptom Awareness: Mood Disorders: Patients received a general overview of mood disorders including depressive disorders, anxiety disorders, and bipolar disorders and how it relates to their current symptoms. The purpose is to promote understanding of their diagnoses and how it impacts their functioning. Patients reviewed their current awareness of symptoms and diagnoses. Patients received information regarding diagnoses, etiology, cultural, and environmental factors as well as impact on functioning.     Patient Session Goals / Objectives:    Discussed patient individual symptoms and experiences    Reviewed diagnostic criteria and etiology of diagnoses       Patient Participation / Response:  Fully participated with the group by sharing personal reflections / insights and openly received / provided feedback with other participants.    Demonstrated understanding of topics discussed through group discussion and participation, Demonstrated understanding of how information regarding symptoms can assist in management of symptoms and Identified / Expressed personal readiness to increase awareness of symptoms and apply skills as necessary    Treatment Plan:  Patient has a current master individualized treatment plan.  See Epic treatment plan for more information.    MANNY Farfan     none

## 2021-12-06 NOTE — GROUP NOTE
Psychotherapy Group Note    PATIENT'S NAME: Francesca Griffin  MRN:   [id number]  :   2000  ACCT. NUMBER: [id number]  DATE OF SERVICE: 21  START TIME:  2:00 PM  END TIME:  2:50 PM  [facility]  TOPIC: MH EBP Group: Cognitive Restructuring  [facility] Adult Mental Health Day Treatment  TRACK: 4B    NUMBER OF PARTICIPANTS: 8    Summary of Group / Topics Discussed:  Cognitive Restructuring: Distortions: Patients received an overview of how to identify common cognitive distortions. Patients will explore alternatives to cognitive distortions and practice challenging their negative thought patterns. The goal is to help patients target modify ineffective thought patterns.     Patient Session Goals / Objectives:    Familiarized self with ineffective / unhealthy thoughts and how they develop.      Explored impact of ineffective thoughts / distortions on mood and activity    Formulated new neutral/positive alternatives to challenge less helpful / ineffective thoughts.    Practiced and plan to apply in daily life                                      Service Modality:  Video Visit     Telemedicine Visit: The patient's condition can be safely assessed and treated via synchronous audio and visual telemedicine encounter.      Reason for Telemedicine Visit: Services only offered telehealth    Originating Site (Patient Location): Patient's home    Distant Site (Provider Location): Provider Remote Setting- Home Office    Consent:  The patient/guardian has verbally consented to: the potential risks and benefits of telemedicine (video visit) versus in person care; bill my insurance or make self-payment for services provided; and responsibility for payment of non-covered services.     Patient would like the video invitation sent by:  My Chart    Mode of Communication:  Video Conference via Medical Zoom    As the provider I attest to compliance with applicable laws and regulations related to  telemedicine.                Patient Participation / Response:  Fully participated with the group by sharing personal reflections / insights and openly received / provided feedback with other participants.    Demonstrated knowledge of personal thought patterns and how they impact their mood and behavior.    Treatment Plan:  Patient has a current master individualized treatment plan.  See Epic treatment plan for more information.    Perla Garvin, [facility]

## 2021-12-07 ENCOUNTER — HOSPITAL ENCOUNTER (OUTPATIENT)
Dept: BEHAVIORAL HEALTH | Facility: CLINIC | Age: 21
End: 2021-12-07
Attending: PSYCHIATRY & NEUROLOGY
Payer: COMMERCIAL

## 2021-12-07 PROCEDURE — 90853 GROUP PSYCHOTHERAPY: CPT | Mod: GT,95

## 2021-12-07 NOTE — GROUP NOTE
Psychotherapy Group Note    PATIENT'S NAME: Francesca Griffin  MRN:   [id number]  :   2000  ACCT. NUMBER: [id number]  DATE OF SERVICE: 21  START TIME:  2:00 PM  END TIME:  2:50 PM  FACILITATOR: Hattie Virk  TOPIC: MH EBP Group: Cognitive Restructuring  [facility] Adult Mental Health Day Treatment  TRACK: 4B                                      Service Modality:  Video Visit     Telemedicine Visit: The patient's condition can be safely assessed and treated via synchronous audio and visual telemedicine encounter.      Reason for Telemedicine Visit: Services only offered telehealth    Originating Site (Patient Location): Patient's home    Distant Site (Provider Location): Provider Remote Setting- Home Office    Consent:  The patient/guardian has verbally consented to: the potential risks and benefits of telemedicine (video visit) versus in person care; bill my insurance or make self-payment for services provided; and responsibility for payment of non-covered services.     Patient would like the video invitation sent by:  My Chart    Mode of Communication:  Video Conference via Medical Zoom    As the provider I attest to compliance with applicable laws and regulations related to telemedicine.         NUMBER OF PARTICIPANTS: 8    Summary of Group / Topics Discussed:  Cognitive Restructuring: Core Beliefs: Patients received an overview of what a core belief is, and how they develop. Patients then began to identify their negative core beliefs. Patients worked to modify core beliefs with the goal of improved self-image and functioning.     Patient Session Goals / Objectives:    Familiarize self with the concept of core beliefs and how they develop.      Explore personal core beliefs (positive and negative)    Develop / advance recognition of the connection between negative thoughts and negative core beliefs.    Formulate new neutral/positive core beliefs               Patient Participation /  Response:  Fully participated with the group by sharing personal reflections / insights and openly received / provided feedback with other participants.    Demonstrated understanding of topics discussed through group discussion and participation, Expressed understanding of the relationship between behaviors, thoughts, and feelings and Demonstrated knowledge of personal thought patterns and how they impact their mood and behavior.    Treatment Plan:  Patient has a current master individualized treatment plan.  See Epic treatment plan for more information.    Hattie Virk

## 2021-12-07 NOTE — GROUP NOTE
"Process Group Note    PATIENT'S NAME: Francesca Griffin  MRN:   [id number]  :   2000  ACCT. NUMBER: [id number]  DATE OF SERVICE: 21  START TIME:  1:00 PM  END TIME:  1:50 PM  FACILITATOR: Adrianna Montoya LGSW  TOPIC:  Process Group    Diagnoses:   Principal DSM5 Diagnoses  (Sustained by DSM5 Criteria Listed Above):   296.33 (F33.2) Major Depressive Disorder, Recurrent Episode, Severe _ and With mixed features.  4. Other Diagnoses that is relevant to services:   300.01 (F41.0) Panic Disorder  300.00 (F41.9) Unspecified Anxiety Disorder.      [facility] Day Treatment  TRACK: 4B    NUMBER OF PARTICIPANTS: 9                                      Service Modality:  Video Visit     Telemedicine Visit: The patient's condition can be safely assessed and treated via synchronous audio and visual telemedicine encounter.      Reason for Telemedicine Visit: Services only offered telehealth    Originating Site (Patient Location): Patient's home    Distant Site (Provider Location): Provider Remote Setting- Home Office    Consent:  The patient/guardian has verbally consented to: the potential risks and benefits of telemedicine (video visit) versus in person care; bill my insurance or make self-payment for services provided; and responsibility for payment of non-covered services.     Patient would like the video invitation sent by:  My Chart    Mode of Communication:  Video Conference via Medical Zoom    As the provider I attest to compliance with applicable laws and regulations related to telemedicine.                Data:    Session content: At the start of this group, patients were invited to check in by identifying themselves, describing their current emotional status, and identifying issues to address in this group.   Area(s) of treatment focus addressed in this session included Symptom Management, Personal Safety and Community Resources/Discharge Planning.  Client reported feeling \"super " "anxious\" and cited the ambiguity and uncertainty related to school withdrawal and future living plans as contributors.  Client reported not knowing when they would be back from staying with their parents in [place].  Client reported having \"breakthrough panic\" last night, affecting their sleep.  Client will talk to their psychiatrist this week and request an increase in their dose of Gabapentin.  Client reported plans to reflect on values and do future planning to figure out what they want in life.  Client reported that they are starting to experience again and noticed stress patterns as significant migraine triggers.  Client will see a headache specialist in January.  Client is currently taking a daily preventative medication to address migraines.  Client reported feeling relieved that their petition to withdraw was approved by their University.  \"That's one thing checked off\".  Client did not report any suicidal ideation, plan or intent.    Therapeutic Interventions/Treatment Strategies:  Psychotherapist offered support, feedback and validation and reinforced use of skills. Treatment modalities used include Cognitive Behavioral Therapy. Interventions include Behavioral Activation: Encouraged strategies to reduce individual procrastination and increase motivation by increasing goal-directed activities to enhance mood and reduce symptoms. and Symptoms Management: Promoted understanding of their diagnoses and how it impacts their functioning.    Assessment:    Patient response:   Patient responded to session by accepting feedback, giving feedback and listening    Possible barriers to participation / learning include: and no barriers identified    Health Issues:   Yes: Migraines, Associated Psychological Distress       Substance Use Review:   Substance Use: No active concerns identified.    Mental Status/Behavioral Observations  Appearance:   Appropriate   Eye Contact:   Good   Psychomotor Behavior: Normal "   Attitude:   Cooperative  Interested Friendly Pleasant  Orientation:   All  Speech   Rate / Production: Normal    Volume:  Normal   Mood:    Anxious  Panicked  Affect:    Appropriate   Thought Content:   Clear and Safety denies any current safety concerns including suicidal ideation, self-harm, and homicidal ideation  Thought Form:  Coherent  Logical     Insight:    Good     Plan:     Safety Plan: No current safety concerns identified.  Recommended that patient call 911 or go to the local ED should there be a change in any of these risk factors.     Barriers to treatment: None identified    Patient Contracts (see media tab):  None    Substance Use: Not addressed in session     Continue or Discharge: Patient will continue in Adult Day Treatment (ADT)  as planned. Patient is likely to benefit from learning and using skills as they work toward the goals identified in their treatment plan.      MANNY Farfan  December 7, 2021

## 2021-12-07 NOTE — GROUP NOTE
Psychoeducation Group Note    PATIENT'S NAME: Francesca Griffin  MRN:   [id number]  :   2000  ACCT. NUMBER: [id number]  DATE OF SERVICE: 21  START TIME:  3:00 PM  END TIME:  3:50 PM  FACILITATOR: Hattie Virk  TOPIC: MANUEL RN Group: Health Maintenance  [facility] Mental Health Day Treatment  TRACK: 4B                                      Service Modality:  Video Visit     Telemedicine Visit: The patient's condition can be safely assessed and treated via synchronous audio and visual telemedicine encounter.      Reason for Telemedicine Visit: Services only offered telehealth    Originating Site (Patient Location): Patient's home    Distant Site (Provider Location): Provider Remote Setting- Home Office    Consent:  The patient/guardian has verbally consented to: the potential risks and benefits of telemedicine (video visit) versus in person care; bill my insurance or make self-payment for services provided; and responsibility for payment of non-covered services.     Patient would like the video invitation sent by:  My Chart    Mode of Communication:  Video Conference via Medical Zoom    As the provider I attest to compliance with applicable laws and regulations related to telemedicine.          NUMBER OF PARTICIPANTS: 8    Summary of Group / Topics Discussed:  Health Maintenance: Discharge planning/Community resources: Patients worked on completing an instructor-facilitated discharge planning activity. Discharge planning begins for all patients after admission. Competent discharge planning promotes a successful transition and decreases the likelihood of mental health relapse. In this group, all dimensions of wellness were reviewed to assess for needs/discharge readiness. These dimensions included: physical, emotional, occupational/productivity, environmental, social, spiritual, intellectual, and financial. Patients worked on completing/updating their discharge planning and identifying their  treatment needs prior to time of discharge.     Patient Session Goals / Objectives:  ? Identified unmet treatment needs to accomplish before discharge  ? Completed all dimensions of the discharge planning packet  ? Participated in the planning process, make phone calls, set up appointments, got connected with community resources, followed up with treatment team as needed         Patient Participation / Response:  Fully participated with the group by sharing personal reflections / insights and openly received / provided feedback with other participants.    Demonstrated understanding of topics discussed through group discussion and participation, Identified / Expressed personal readiness to practice skills and Verbalized understanding of health maintenance topic    Treatment Plan:  Patient has a current master individualized treatment plan.  See Epic treatment plan for more information.    Hattie Virk

## 2021-12-09 ENCOUNTER — HOSPITAL ENCOUNTER (OUTPATIENT)
Dept: BEHAVIORAL HEALTH | Facility: CLINIC | Age: 21
End: 2021-12-09
Attending: PSYCHIATRY & NEUROLOGY
Payer: COMMERCIAL

## 2021-12-09 PROCEDURE — 90853 GROUP PSYCHOTHERAPY: CPT | Mod: GT,95 | Performed by: SOCIAL WORKER

## 2021-12-09 PROCEDURE — 90853 GROUP PSYCHOTHERAPY: CPT | Mod: GT,95

## 2021-12-09 NOTE — GROUP NOTE
Psychotherapy Group Note    PATIENT'S NAME: Francesca Griffin  MRN:   [id number]  :   2000  ACCT. NUMBER: [id number]  DATE OF SERVICE: 21  START TIME:  2:00 PM  END TIME:  2:50 PM  FACILITATOR: Perla Garvin LICSW  TOPIC: MH EBP Group: Cognitive Restructuring  [facility] Adult Mental Health Day Treatment  TRACK: 4B    NUMBER OF PARTICIPANTS: 9    Summary of Group / Topics Discussed:  Cognitive Restructuring: Core Beliefs: Patients received an overview of what a core belief is, and how they develop. Patients then began to identify their negative core beliefs. Patients worked to modify core beliefs with the goal of improved self-image and functioning.     Patient Session Goals / Objectives:    Familiarize self with the concept of core beliefs and how they develop.      Explore personal core beliefs (positive and negative)    Develop / advance recognition of the connection between negative thoughts and negative core beliefs.    Formulate new neutral/positive core beliefs    Reviewed affirmations and how to construct them.             Patient Participation / Response:  Fully participated with the group by sharing personal reflections / insights and openly received / provided feedback with other participants.    Demonstrated knowledge of personal thought patterns and how they impact their mood and behavior.    Treatment Plan:  Patient has See Epic Treatment Plan - Patient is discharging.    BARRY Hinojosa

## 2021-12-09 NOTE — GROUP NOTE
Psychotherapy Group Note    PATIENT'S NAME: Francesca Griffin  MRN:   [id number]  :   2000  ACCT. NUMBER: [id number]  DATE OF SERVICE: 21  START TIME:  3:00 PM  END TIME:  3:50 PM  FACILITATOR: Adrianna Montoya LGSW  TOPIC:  EBP Group: [facility] Adult Mental Health Day Treatment  TRACK: 4B    NUMBER OF PARTICIPANTS: 9    Summary of Group / Topics Discussed:  Mindfulness: Mindfulness Experiential: Patients received an overview on what mindfulness is and how mindfulness can benefit general health, mental health symptoms, and stressors. The history of mindfulness, its application to mental health therapies, and key concepts were also discussed. Patients discussed current awareness, knowledge, and practice of mindfulness skills. Patients also discussed barriers to mindfulness practice.    Patient Session Goals / Objectives:    Demonstrated and verbalized understanding of key mindfulness concepts    Identified when/how to use mindfulness skills    Resolved barriers to practicing mindfulness skills    Identified plan to use mindfulness skills in daily life       Patient Participation / Response:  Fully participated with the group by sharing personal reflections / insights and openly received / provided feedback with other participants.    Demonstrated understanding of topics discussed through group discussion and participation and Demonstrated understanding of mindfulness skills and benefits of practice    Treatment Plan:  Patient has a current master individualized treatment plan.  See Epic treatment plan for more information.    MANNY Farfan

## 2021-12-09 NOTE — GROUP NOTE
Process Group Note    PATIENT'S NAME: Francesca Griffin  MRN:   [id number]  :   2000  ACCT. NUMBER: [id number]  DATE OF SERVICE: 21  START TIME:  1:00 PM  END TIME:  1:50 PM  FACILITATOR: Perla Garvin [facility]  TOPIC:  Process Group    Diagnoses:  296.33 (F33.2) Major Depressive Disorder, Recurrent Episode, Severe _ and With mixed features.  300.01 (F41.0) Panic Disorder  300.00 (F41.9) Unspecified Anxiety Disorder.      [facility] Mental Health Day Treatment  TRACK: 4B    NUMBER OF PARTICIPANTS: 9                                      Service Modality:  Video Visit     Telemedicine Visit: The patient's condition can be safely assessed and treated via synchronous audio and visual telemedicine encounter.      Reason for Telemedicine Visit: Services only offered telehealth    Originating Site (Patient Location): Patient's home    Distant Site (Provider Location): Provider Remote Setting- Home Office    Consent:  The patient/guardian has verbally consented to: the potential risks and benefits of telemedicine (video visit) versus in person care; bill my insurance or make self-payment for services provided; and responsibility for payment of non-covered services.     Patient would like the video invitation sent by:  My Chart    Mode of Communication:  Video Conference via Medical Zoom    As the provider I attest to compliance with applicable laws and regulations related to telemedicine.               Data:    Session content: At the start of this group, patients were invited to check in by identifying themselves, describing their current emotional status, and identifying issues to address in this group.   Area(s) of treatment focus addressed in this session included Symptom Management, Personal Safety and Community Resources/Discharge Planning.  Francesca reported having  migraines for the past two days.  They are preparing to leave for home on Saturday.  [place]   to either stay or to pack and move home.  Client shared discharge plan with the group.  Client was receptive to feedback from peers on their strengths and progress in the program.  Client denied suicidal ideation, intent and plan.     Therapeutic Interventions/Treatment Strategies:  Psychotherapist offered support, feedback and validation and reinforced use of skills. Treatment modalities used include Cognitive Behavioral Therapy. Interventions include Coping Skills: Assisted patient in identifying 1-2 healthy distraction skills to reduce overall distress.    Assessment:    Patient response:   Patient responded to session by being attentive and accepting support    Possible barriers to participation / learning include: and no barriers identified    Health Issues:   None reported       Substance Use Review:   Substance Use: No active concerns identified.    Mental Status/Behavioral Observations  Appearance:   Appropriate   Eye Contact:   Good   Psychomotor Behavior: Normal   Attitude:   Cooperative   Orientation:   All  Speech   Rate / Production: Normal    Volume:  Normal   Mood:    Anxious  Depressed   Affect:    Appropriate   Thought Content:   Clear  Thought Form:  Coherent  Logical     Insight:    Good     Plan:     Safety Plan: No current safety concerns identified.  Recommended that patient call 911 or go to the local ED should there be a change in any of these risk factors.     Barriers to treatment: None identified    Patient Contracts (see media tab):  None    Substance Use: Not addressed in session     Continue or Discharge: Patient will continue in Adult Day Treatment (ADT)  as planned. Patient is likely to benefit from learning and using skills as they work toward the goals identified in their treatment plan.      Perla Garvin, [facility]  December 9, 2021

## 2021-12-10 ENCOUNTER — IMMUNIZATION (OUTPATIENT)
Dept: NURSING | Facility: CLINIC | Age: 21
End: 2021-12-10
Payer: COMMERCIAL

## 2021-12-10 PROCEDURE — 90471 IMMUNIZATION ADMIN: CPT

## 2021-12-10 PROCEDURE — 0003A PR COVID VAC PFIZER DIL RECON 30 MCG/0.3 ML IM: CPT

## 2021-12-10 PROCEDURE — 90682 RIV4 VACC RECOMBINANT DNA IM: CPT

## 2021-12-10 PROCEDURE — 91300 PR COVID VAC PFIZER DIL RECON 30 MCG/0.3 ML IM: CPT

## 2021-12-21 NOTE — TELEPHONE ENCOUNTER
FUTURE VISIT INFORMATION      FUTURE VISIT INFORMATION:    Date: 1/20/2022    Time: 1030am    [place] [facility] [place]:    Referring provider:  Dr. Rust     Referring providers clinic:  [facility]     Reason for visit/diagnosis  Migraines     RECORDS REQUESTED FROM:       Clinic name Comments Records Status Imaging Status   [facility]   Scanned to Chart N/A

## 2022-01-20 ENCOUNTER — PRE VISIT (OUTPATIENT)
Dept: NEUROLOGY | Facility: CLINIC | Age: 22
End: 2022-01-20

## 2022-09-24 ENCOUNTER — HEALTH MAINTENANCE LETTER (OUTPATIENT)
Age: 22
End: 2022-09-24

## 2023-01-29 ENCOUNTER — HEALTH MAINTENANCE LETTER (OUTPATIENT)
Age: 23
End: 2023-01-29

## 2024-02-25 ENCOUNTER — HEALTH MAINTENANCE LETTER (OUTPATIENT)
Age: 24
End: 2024-02-25